# Patient Record
Sex: MALE | Race: WHITE | Employment: FULL TIME | URBAN - METROPOLITAN AREA
[De-identification: names, ages, dates, MRNs, and addresses within clinical notes are randomized per-mention and may not be internally consistent; named-entity substitution may affect disease eponyms.]

---

## 2017-01-30 RX ORDER — FINASTERIDE 1 MG/1
TABLET, FILM COATED ORAL
Qty: 30 TABLET | Refills: 11 | Status: SHIPPED | OUTPATIENT
Start: 2017-01-30 | End: 2017-02-14 | Stop reason: SDUPTHER

## 2017-02-14 ENCOUNTER — OFFICE VISIT (OUTPATIENT)
Dept: FAMILY MEDICINE CLINIC | Age: 42
End: 2017-02-14

## 2017-02-14 VITALS
DIASTOLIC BLOOD PRESSURE: 86 MMHG | TEMPERATURE: 98.7 F | HEIGHT: 75 IN | BODY MASS INDEX: 35.56 KG/M2 | SYSTOLIC BLOOD PRESSURE: 124 MMHG | RESPIRATION RATE: 16 BRPM | HEART RATE: 76 BPM | WEIGHT: 286 LBS

## 2017-02-14 DIAGNOSIS — Z13.220 LIPID SCREENING: ICD-10-CM

## 2017-02-14 DIAGNOSIS — Z00.00 PE (PHYSICAL EXAM), ANNUAL: Primary | ICD-10-CM

## 2017-02-14 DIAGNOSIS — E78.00 HYPERCHOLESTEROLEMIA: Primary | ICD-10-CM

## 2017-02-14 DIAGNOSIS — F60.5 OBSESSIVE-COMPULSIVE PERSONALITY DISORDER (HCC): ICD-10-CM

## 2017-02-14 DIAGNOSIS — E66.9 OBESITY, UNSPECIFIED OBESITY SEVERITY, UNSPECIFIED OBESITY TYPE: ICD-10-CM

## 2017-02-14 DIAGNOSIS — L64.9 MALE PATTERN BALDNESS: ICD-10-CM

## 2017-02-14 LAB
A/G RATIO: 2 (ref 1.1–2.2)
ALBUMIN SERPL-MCNC: 4.6 G/DL (ref 3.4–5)
ALP BLD-CCNC: 68 U/L (ref 40–129)
ALT SERPL-CCNC: 24 U/L (ref 10–40)
ANION GAP SERPL CALCULATED.3IONS-SCNC: 17 MMOL/L (ref 3–16)
AST SERPL-CCNC: 24 U/L (ref 15–37)
BILIRUB SERPL-MCNC: 0.7 MG/DL (ref 0–1)
BUN BLDV-MCNC: 13 MG/DL (ref 7–20)
CALCIUM SERPL-MCNC: 9 MG/DL (ref 8.3–10.6)
CHLORIDE BLD-SCNC: 101 MMOL/L (ref 99–110)
CHOLESTEROL, TOTAL: 210 MG/DL (ref 0–199)
CO2: 23 MMOL/L (ref 21–32)
CREAT SERPL-MCNC: 0.9 MG/DL (ref 0.9–1.3)
GFR AFRICAN AMERICAN: >60
GFR NON-AFRICAN AMERICAN: >60
GLOBULIN: 2.3 G/DL
GLUCOSE BLD-MCNC: 99 MG/DL (ref 70–99)
HDLC SERPL-MCNC: 41 MG/DL (ref 40–60)
LDL CHOLESTEROL CALCULATED: 153 MG/DL
POTASSIUM SERPL-SCNC: 4.2 MMOL/L (ref 3.5–5.1)
SODIUM BLD-SCNC: 141 MMOL/L (ref 136–145)
TOTAL PROTEIN: 6.9 G/DL (ref 6.4–8.2)
TRIGL SERPL-MCNC: 82 MG/DL (ref 0–150)
VLDLC SERPL CALC-MCNC: 16 MG/DL

## 2017-02-14 PROCEDURE — 99396 PREV VISIT EST AGE 40-64: CPT | Performed by: INTERNAL MEDICINE

## 2017-02-14 PROCEDURE — G0444 DEPRESSION SCREEN ANNUAL: HCPCS | Performed by: INTERNAL MEDICINE

## 2017-02-14 RX ORDER — FINASTERIDE 1 MG/1
TABLET, FILM COATED ORAL
Qty: 30 TABLET | Refills: 11 | Status: SHIPPED | OUTPATIENT
Start: 2017-02-14 | End: 2017-04-11 | Stop reason: SDUPTHER

## 2017-02-14 ASSESSMENT — ENCOUNTER SYMPTOMS
EYE PAIN: 0
WHEEZING: 0
VOMITING: 0
DIARRHEA: 0
COLOR CHANGE: 0
NAUSEA: 0
CONSTIPATION: 0
SHORTNESS OF BREATH: 0

## 2017-02-14 ASSESSMENT — PATIENT HEALTH QUESTIONNAIRE - PHQ9
SUM OF ALL RESPONSES TO PHQ9 QUESTIONS 1 & 2: 2
10. IF YOU CHECKED OFF ANY PROBLEMS, HOW DIFFICULT HAVE THESE PROBLEMS MADE IT FOR YOU TO DO YOUR WORK, TAKE CARE OF THINGS AT HOME, OR GET ALONG WITH OTHER PEOPLE: 0
9. THOUGHTS THAT YOU WOULD BE BETTER OFF DEAD, OR OF HURTING YOURSELF: 0
5. POOR APPETITE OR OVEREATING: 1
2. FEELING DOWN, DEPRESSED OR HOPELESS: 2
4. FEELING TIRED OR HAVING LITTLE ENERGY: 0
SUM OF ALL RESPONSES TO PHQ QUESTIONS 1-9: 8
8. MOVING OR SPEAKING SO SLOWLY THAT OTHER PEOPLE COULD HAVE NOTICED. OR THE OPPOSITE, BEING SO FIGETY OR RESTLESS THAT YOU HAVE BEEN MOVING AROUND A LOT MORE THAN USUAL: 0
6. FEELING BAD ABOUT YOURSELF - OR THAT YOU ARE A FAILURE OR HAVE LET YOURSELF OR YOUR FAMILY DOWN: 3
1. LITTLE INTEREST OR PLEASURE IN DOING THINGS: 0
7. TROUBLE CONCENTRATING ON THINGS, SUCH AS READING THE NEWSPAPER OR WATCHING TELEVISION: 0
3. TROUBLE FALLING OR STAYING ASLEEP: 2

## 2017-12-08 ENCOUNTER — TELEPHONE (OUTPATIENT)
Dept: FAMILY MEDICINE CLINIC | Age: 42
End: 2017-12-08

## 2017-12-08 NOTE — TELEPHONE ENCOUNTER
Pt is calling to see if he can get in today   He is having a panic attack and his psychiatrist is not in today. Please advise what you want to do for him.

## 2018-01-18 ENCOUNTER — TELEPHONE (OUTPATIENT)
Dept: FAMILY MEDICINE CLINIC | Age: 43
End: 2018-01-18

## 2018-01-19 NOTE — TELEPHONE ENCOUNTER
LMOM to tell the pt that the switch will be ok for him to tranfer from Dr. Lamont Hart to Dr. Marya Martinez. Thanks.

## 2018-02-05 ENCOUNTER — OFFICE VISIT (OUTPATIENT)
Dept: FAMILY MEDICINE CLINIC | Age: 43
End: 2018-02-05

## 2018-02-05 VITALS
RESPIRATION RATE: 18 BRPM | OXYGEN SATURATION: 98 % | WEIGHT: 286 LBS | TEMPERATURE: 98.8 F | HEART RATE: 82 BPM | BODY MASS INDEX: 35.56 KG/M2 | HEIGHT: 75 IN | SYSTOLIC BLOOD PRESSURE: 122 MMHG | DIASTOLIC BLOOD PRESSURE: 80 MMHG

## 2018-02-05 DIAGNOSIS — R45.89 DEPRESSED MOOD: ICD-10-CM

## 2018-02-05 DIAGNOSIS — Z00.00 WELL ADULT HEALTH CHECK: Primary | ICD-10-CM

## 2018-02-05 DIAGNOSIS — E66.3 OVERWEIGHT: ICD-10-CM

## 2018-02-05 DIAGNOSIS — Z23 NEED FOR INFLUENZA VACCINATION: ICD-10-CM

## 2018-02-05 DIAGNOSIS — Z82.49 FAMILY HISTORY OF HEART DISEASE: ICD-10-CM

## 2018-02-05 PROCEDURE — 99396 PREV VISIT EST AGE 40-64: CPT | Performed by: FAMILY MEDICINE

## 2018-02-05 PROCEDURE — 90630 INFLUENZA, QUADV, 18-64 YRS, ID, PF, MICRO INJ, 0.1ML (FLUZONE QUADV, PF): CPT | Performed by: FAMILY MEDICINE

## 2018-02-05 PROCEDURE — 90471 IMMUNIZATION ADMIN: CPT | Performed by: FAMILY MEDICINE

## 2018-02-05 RX ORDER — MULTIVITAMIN WITH IRON
100 TABLET ORAL DAILY
COMMUNITY
End: 2019-04-11

## 2018-02-05 RX ORDER — UBIDECARENONE 75 MG
50 CAPSULE ORAL DAILY
COMMUNITY
End: 2019-04-11

## 2018-02-05 RX ORDER — ESZOPICLONE 3 MG/1
3 TABLET, FILM COATED ORAL NIGHTLY
COMMUNITY

## 2018-02-05 RX ORDER — VENLAFAXINE 75 MG/1
75 TABLET ORAL 3 TIMES DAILY
COMMUNITY
End: 2020-06-22 | Stop reason: ALTCHOICE

## 2018-02-05 ASSESSMENT — ENCOUNTER SYMPTOMS
COLOR CHANGE: 0
SORE THROAT: 0
DIARRHEA: 0
SHORTNESS OF BREATH: 0
SINUS PRESSURE: 0
EYE REDNESS: 0
NAUSEA: 0
VOMITING: 0
COUGH: 0

## 2018-02-05 NOTE — PROGRESS NOTES
Results   Component Value Date    ALT 24 02/14/2017    AST 24 02/14/2017    ALKPHOS 68 02/14/2017    BILITOT 0.7 02/14/2017      Lab Results   Component Value Date    WBC 4.9 01/19/2012    HGB 16.0 01/19/2012    HCT 46.3 01/19/2012    MCV 86.3 01/19/2012     01/19/2012     TSH (uIU/ml)   Date Value   01/19/2012 1.76      PHYSICAL EXAM  /80 (Site: Right Arm, Position: Sitting, Cuff Size: Large Adult)   Pulse 82   Temp 98.8 °F (37.1 °C) (Oral)   Resp 18   Ht 6' 3\" (1.905 m)   Wt 286 lb (129.7 kg)   SpO2 98%   BMI 35.75 kg/m²     BP Readings from Last 3 Encounters:   02/05/18 122/80   02/14/17 124/86   02/20/16 128/70       Wt Readings from Last 3 Encounters:   02/05/18 286 lb (129.7 kg)   02/14/17 286 lb (129.7 kg)   02/20/16 293 lb (132.9 kg)        Physical Exam   Constitutional: He is oriented to person, place, and time. He appears well-developed and well-nourished. HENT:   Head: Normocephalic and atraumatic. Mouth/Throat: Oropharynx is clear and moist.   TMs normal bilaterally   Eyes: Conjunctivae and EOM are normal.   Neck: Normal range of motion. Neck supple. No thyromegaly present. Cardiovascular: Normal rate, regular rhythm and normal heart sounds. No murmur heard. Pulmonary/Chest: Effort normal and breath sounds normal. He has no wheezes. Abdominal: Soft. Bowel sounds are normal. He exhibits no mass. There is no tenderness. Musculoskeletal: Normal range of motion. He exhibits no edema. Lymphadenopathy:     He has no cervical adenopathy. Neurological: He is alert and oriented to person, place, and time. He displays normal reflexes. Skin: Skin is warm and dry. No rash noted. Normal turgor   Psychiatric: He has a normal mood and affect. Thought content normal.       ASSESSMENT/PLAN:  1. Well adult health check  Reviewed healthy lifestyle. He is rather healthy, encouraged increased physical activity  - Comprehensive Metabolic Panel;  Future  - Hemoglobin A1C; Future  - Lipid Panel; Future  - TSH without Reflex; Future    2. Family history of heart disease  Reviewed ASCVD risk score. Low risk no statin medicine at this time. 3. Overweight  Encouraged exercise as above    4. Depressed mood  Following Dr. Rodríguez Fried psychiatry. Reports improvement on Effexor    5. Need for influenza vaccination  - INFLUENZA, QUADV, 18-64 YRS, ID, PF, MICRO INJ, 0.1ML (Nasima Everett, JUAN)        Return in about 1 year (around 2/5/2019) for well check.

## 2018-02-05 NOTE — PATIENT INSTRUCTIONS
Blood pressure cuff: Omron brand is recommended. We are looking for a number less than 140/90. Check 3 times per week after resting for 3-5 minutes.

## 2018-09-06 ENCOUNTER — OFFICE VISIT (OUTPATIENT)
Dept: FAMILY MEDICINE CLINIC | Age: 43
End: 2018-09-06

## 2018-09-06 VITALS
SYSTOLIC BLOOD PRESSURE: 130 MMHG | RESPIRATION RATE: 18 BRPM | DIASTOLIC BLOOD PRESSURE: 80 MMHG | WEIGHT: 298 LBS | HEART RATE: 84 BPM | BODY MASS INDEX: 37.25 KG/M2

## 2018-09-06 DIAGNOSIS — L25.5 CONTACT DERMATITIS DUE TO PLANT: Primary | ICD-10-CM

## 2018-09-06 PROCEDURE — 99213 OFFICE O/P EST LOW 20 MIN: CPT | Performed by: NURSE PRACTITIONER

## 2018-09-06 RX ORDER — PREDNISONE 10 MG/1
TABLET ORAL
Qty: 27 TABLET | Refills: 0 | Status: SHIPPED | OUTPATIENT
Start: 2018-09-06 | End: 2018-09-16

## 2018-09-06 RX ORDER — FINASTERIDE 1 MG/1
TABLET, FILM COATED ORAL
Qty: 90 TABLET | Refills: 3 | Status: SHIPPED | OUTPATIENT
Start: 2018-09-06 | End: 2019-09-17 | Stop reason: SDUPTHER

## 2018-09-06 ASSESSMENT — PATIENT HEALTH QUESTIONNAIRE - PHQ9
SUM OF ALL RESPONSES TO PHQ9 QUESTIONS 1 & 2: 0
SUM OF ALL RESPONSES TO PHQ QUESTIONS 1-9: 0
2. FEELING DOWN, DEPRESSED OR HOPELESS: 0
SUM OF ALL RESPONSES TO PHQ QUESTIONS 1-9: 0
1. LITTLE INTEREST OR PLEASURE IN DOING THINGS: 0

## 2018-09-06 ASSESSMENT — ENCOUNTER SYMPTOMS
COUGH: 0
TROUBLE SWALLOWING: 0
SHORTNESS OF BREATH: 0
SORE THROAT: 0

## 2018-09-06 NOTE — PROGRESS NOTES
throat and trouble swallowing. Respiratory: Negative for cough and shortness of breath. Cardiovascular: Negative for chest pain. Skin: Positive for rash. Vitals:    09/06/18 1050   BP: 130/80   Site: Left Arm   Position: Sitting   Cuff Size: Large Adult   Pulse: 84   Resp: 18   Weight: 298 lb (135.2 kg)       Body mass index is 37.25 kg/m². Wt Readings from Last 3 Encounters:   09/06/18 298 lb (135.2 kg)   02/05/18 286 lb (129.7 kg)   02/14/17 286 lb (129.7 kg)       BP Readings from Last 3 Encounters:   09/06/18 130/80   02/05/18 122/80   02/14/17 124/86       Physical Exam   Constitutional: He is oriented to person, place, and time. He appears well-developed and well-nourished. No distress. HENT:   Head: Normocephalic and atraumatic. Eyes: EOM are normal.   Pulmonary/Chest: Effort normal.   Neurological: He is alert and oriented to person, place, and time. Skin: Skin is warm and dry. Jeffrey upper extremities with patches of erythematous skin. Positive for some intact blisters. No s/s of infection. Left side of neck and left side of face with patched of erythematous skin. No blistering noted. Psychiatric: He has a normal mood and affect. His behavior is normal. Judgment and thought content normal.   Nursing note and vitals reviewed. Assessment and Plan  Rojelio Crawford was seen today for poison ivy. Diagnoses and all orders for this visit:    Contact dermatitis due to plant  -     predniSONE (DELTASONE) 10 MG tablet; Take 4 tablets by mouth daily x3 days, 3 tablets x3 days, 2 tablets x2 days, 1 tablets x2 days. -     triamcinolone (KENALOG) 0.1 % ointment; Apply topically 2 times daily PRN  Cool compresses PRN  Advised not to scratch area. To monitor for secondary infection. Allegra 180 mg in AM and Benadryl 25-50 mg qhs. Advised to try to avoid plant contact in the future. Patient is to call if symptoms worsen or fail to improve. Return if symptoms worsen or fail to improve.

## 2018-09-25 ENCOUNTER — PATIENT MESSAGE (OUTPATIENT)
Dept: FAMILY MEDICINE CLINIC | Age: 43
End: 2018-09-25

## 2018-09-25 DIAGNOSIS — H91.90 HEARING PROBLEM, UNSPECIFIED LATERALITY: Primary | ICD-10-CM

## 2018-09-26 NOTE — TELEPHONE ENCOUNTER
From: Milagros Eisenberg  To: Bhanu Dee MD  Sent: 9/25/2018 5:01 PM EDT  Subject: Non-Urgent Medical Question    I've noticed lately that I have a loss of midrange frequencies in my left ear so I made an appointment with Dr. Saravanan Hill [email or web address] to take a look at me. The  who made the appointment said I might need to get a referral first so that the insurance will pay for it. So, I thought I'd keep the appointment (for Oct 5) and then see about getting a referral.    I've also had ringing in my ear, on and off, which seems to be related to Effexor that I'm taking. Should I schedule an appointment with you?      Thanks,  Day Marquez

## 2018-10-10 ENCOUNTER — OFFICE VISIT (OUTPATIENT)
Dept: ENT CLINIC | Age: 43
End: 2018-10-10
Payer: COMMERCIAL

## 2018-10-10 VITALS
SYSTOLIC BLOOD PRESSURE: 128 MMHG | WEIGHT: 297.4 LBS | HEIGHT: 73 IN | HEART RATE: 73 BPM | DIASTOLIC BLOOD PRESSURE: 81 MMHG | BODY MASS INDEX: 39.42 KG/M2 | TEMPERATURE: 98.7 F

## 2018-10-10 DIAGNOSIS — H93.13 TINNITUS OF BOTH EARS: ICD-10-CM

## 2018-10-10 DIAGNOSIS — H91.22 SUDDEN IDIOPATHIC HEARING LOSS OF LEFT EAR WITH UNRESTRICTED HEARING OF RIGHT EAR: Primary | ICD-10-CM

## 2018-10-10 PROCEDURE — 99243 OFF/OP CNSLTJ NEW/EST LOW 30: CPT | Performed by: OTOLARYNGOLOGY

## 2018-10-10 ASSESSMENT — ENCOUNTER SYMPTOMS
COLOR CHANGE: 0
SORE THROAT: 0
RHINORRHEA: 0
VOMITING: 0
CONSTIPATION: 0
PHOTOPHOBIA: 0
EYE DISCHARGE: 0
SINUS PRESSURE: 0
EYE ITCHING: 0
SINUS PAIN: 0
WHEEZING: 0
TROUBLE SWALLOWING: 0
SHORTNESS OF BREATH: 0
STRIDOR: 0
BLOOD IN STOOL: 0
COUGH: 0
DIARRHEA: 0
NAUSEA: 0
VOICE CHANGE: 0
FACIAL SWELLING: 0
CHOKING: 0
BACK PAIN: 0

## 2018-10-10 NOTE — PATIENT INSTRUCTIONS
There is slight asymmetry on the left ear at high frequencies. We will recheck the hearing in 6 months. Call is symptoms worsen sooner.

## 2018-10-10 NOTE — PROGRESS NOTES
Negative for adenopathy. Does not bruise/bleed easily. Psychiatric/Behavioral: Negative for agitation, confusion and sleep disturbance. Physical Exam     Vitals:    10/10/18 1309   BP: 128/81   Pulse: 73   Temp: 98.7 °F (37.1 °C)       Physical Exam   Constitutional: He is oriented to person, place, and time. He appears well-developed and well-nourished. HENT:   Head: Normocephalic and atraumatic. Not macrocephalic and not microcephalic. Head is without raccoon's eyes, without Brooke's sign, without abrasion, without contusion, without laceration, without right periorbital erythema and without left periorbital erythema. Hair is normal.   Right Ear: Hearing, tympanic membrane and external ear normal. No drainage, swelling or tenderness. No mastoid tenderness. Tympanic membrane is not perforated, not retracted and not bulging. Tympanic membrane mobility is normal. No middle ear effusion. No decreased hearing is noted. Left Ear: Hearing, tympanic membrane and external ear normal. No drainage, swelling or tenderness. No mastoid tenderness. Tympanic membrane is not perforated, not retracted and not bulging. Tympanic membrane mobility is normal.  No middle ear effusion. No decreased hearing is noted. Nose: Septal deviation present. No mucosal edema, rhinorrhea, nose lacerations, sinus tenderness, nasal deformity or nasal septal hematoma. No epistaxis. No foreign bodies. Right sinus exhibits no maxillary sinus tenderness and no frontal sinus tenderness. Left sinus exhibits no maxillary sinus tenderness and no frontal sinus tenderness. Mouth/Throat: Uvula is midline and oropharynx is clear and moist. Mucous membranes are not pale, not dry and not cyanotic. No oral lesions. No trismus in the jaw. Normal dentition. No dental abscesses, uvula swelling, lacerations or dental caries. No oropharyngeal exudate, posterior oropharyngeal edema, posterior oropharyngeal erythema or tonsillar abscesses.    Faye normal

## 2019-04-11 ENCOUNTER — OFFICE VISIT (OUTPATIENT)
Dept: ENT CLINIC | Age: 44
End: 2019-04-11
Payer: COMMERCIAL

## 2019-04-11 VITALS
WEIGHT: 300 LBS | DIASTOLIC BLOOD PRESSURE: 81 MMHG | BODY MASS INDEX: 39.76 KG/M2 | SYSTOLIC BLOOD PRESSURE: 129 MMHG | HEART RATE: 78 BPM | TEMPERATURE: 98.5 F | HEIGHT: 73 IN

## 2019-04-11 DIAGNOSIS — H91.22 SUDDEN IDIOPATHIC HEARING LOSS OF LEFT EAR WITH UNRESTRICTED HEARING OF RIGHT EAR: Primary | ICD-10-CM

## 2019-04-11 DIAGNOSIS — H93.13 TINNITUS OF BOTH EARS: ICD-10-CM

## 2019-04-11 PROCEDURE — 99213 OFFICE O/P EST LOW 20 MIN: CPT | Performed by: OTOLARYNGOLOGY

## 2019-04-11 ASSESSMENT — ENCOUNTER SYMPTOMS
COLOR CHANGE: 0
NAUSEA: 0
DIARRHEA: 0
SORE THROAT: 0
EYE PAIN: 0
RHINORRHEA: 0
STRIDOR: 0
SINUS PAIN: 0
FACIAL SWELLING: 0
COUGH: 0
VOICE CHANGE: 0
EYE ITCHING: 0
SINUS PRESSURE: 0
TROUBLE SWALLOWING: 0
EYE REDNESS: 0
CHOKING: 0
PHOTOPHOBIA: 0
SHORTNESS OF BREATH: 0

## 2019-04-11 NOTE — PROGRESS NOTES
[facility]  [facility] [address]LLI [address], [address] [facility], [address] [place] [address]  P: [phone]  F: [phone]       Patient     Jaylarily Dry  1975    ChiefComplaint     Chief Complaint   Patient presents with    Follow-up     Hearling Loss, no change       History of Present Illness     Chava Sow is here for six-month follow-up for left-sided sudden hearing loss. He states there is no significant change in hearing in the left ear. He has persistent bilateral tinnitus. Feels like the hearing may have improved somewhat. No ear infections or ear pain or dizziness over the last 6 months. Past Medical History     Past Medical History:   Diagnosis Date    Allergic rhinitis     Hearing loss     Rash     Sleep apnea     Tinnitus        Past Surgical History     Past Surgical History:   Procedure Laterality Date    APPENDECTOMY         Family History     Family History   Problem Relation Age of Onset    Heart Disease Father     Hypertension Father     Hypertension Brother     Diabetes Paternal Grandmother     Colon Cancer Neg Hx        Social History     Social History     Socioeconomic History    Marital status:      Spouse name: Leni [place] Number of children: 0    Years of education: Not on file    Highest education level: Not on file   Occupational History    Occupation: Manager of [employer]    Social Needs    Financial resource strain: Not on file    Food insecurity:     Worry: Not on file     Inability: Not on file    Transportation needs:     Medical: Not on file     Non-medical: Not on file   Tobacco Use    Smoking status: Never Smoker    Smokeless tobacco: Never Used    Tobacco comment: congratulated on nonsmoking status. Substance and Sexual Activity    Alcohol use:  Yes     Alcohol/week: 0.0 oz    Drug use: No    Sexual activity: Yes     Partners: Female   Lifestyle    Physical activity:     Days per week: Not on file     Minutes per session: Not on file    Stress: Not on file   Relationships    Social connections:     Talks on phone: Not on file     Gets together: Not on file     Attends Gnosticist service: Not on file     Active member of club or organization: Not on file     Attends meetings of clubs or organizations: Not on file     Relationship status: Not on file    Intimate partner violence:     Fear of current or ex partner: Not on file     Emotionally abused: Not on file     Physically abused: Not on file     Forced sexual activity: Not on file   Other Topics Concern    Not on file   Social History Narrative    Not on file       Allergies     Allergies   Allergen Reactions    Ampicillin     Cipro Xr        Medications     Current Outpatient Medications   Medication Sig Dispense Refill    finasteride (PROPECIA) 1 MG tablet TAKE 1 TABLET BY MOUTH DAILY 90 tablet 3    venlafaxine (EFFEXOR) 75 MG tablet Take 75 mg by mouth 3 times daily      eszopiclone (LUNESTA) 3 MG TABS Take 3 mg by mouth nightly.  Armodafinil (NUVIGIL PO) Take 150 mg by mouth daily        No current facility-administered medications for this visit. Review of Systems     Review of Systems   Constitutional: Negative for chills, fatigue and fever. HENT: Positive for hearing loss and tinnitus. Negative for congestion, ear discharge, ear pain, facial swelling, nosebleeds, postnasal drip, rhinorrhea, sinus pressure, sinus pain, sneezing, sore throat, trouble swallowing and voice change. Eyes: Negative for photophobia, pain, redness, itching and visual disturbance. Respiratory: Negative for cough, choking, shortness of breath and stridor. Gastrointestinal: Negative for diarrhea and nausea. Musculoskeletal: Negative for neck pain and neck stiffness. Skin: Negative for color change and rash. Neurological: Negative for dizziness, facial asymmetry and light-headedness. Hematological: Negative for adenopathy. Psychiatric/Behavioral: Negative for agitation and confusion. PhysicalExam     Vitals:    04/11/19 1120   BP: 129/81   Pulse: 78   Temp: 98.5 °F (36.9 °C)       Physical Exam   Constitutional: He is oriented to person, place, and time. He appears well-developed and well-nourished. HENT:   Head: Normocephalic and atraumatic. Right Ear: Tympanic membrane, external ear and ear canal normal. No drainage. Tympanic membrane is not perforated. No middle ear effusion. Left Ear: Tympanic membrane, external ear and ear canal normal. No drainage. Tympanic membrane is not perforated. No middle ear effusion. Nose: No mucosal edema, rhinorrhea or septal deviation. No epistaxis. Mouth/Throat: Uvula is midline, oropharynx is clear and moist and mucous membranes are normal. No trismus in the jaw. Normal dentition. No oropharyngeal exudate. Eyes: Pupils are equal, round, and reactive to light. EOM are normal. Right eye exhibits no discharge. Left eye exhibits no discharge. No scleral icterus. Neck: Phonation normal. Neck supple. No tracheal deviation present. No thyromegaly present. Pulmonary/Chest: Effort normal. No stridor. No respiratory distress. Lymphadenopathy:     He has no cervical adenopathy. Neurological: He is alert and oriented to person, place, and time. No cranial nerve deficit. Skin: Skin is warm and dry. Psychiatric: He has a normal mood and affect. His behavior is normal.         Procedure           Assessment and Plan     1. Sudden idiopathic hearing loss of left ear with unrestricted hearing of right ear  Significant change in hearing subjectively. We will obtain a repeat audiogram.  I will call him with the results once completed. - Audiometry with tympanometry; Future    2. Tinnitus of both ears    - Audiometry with tympanometry; Future      Return if symptoms worsen or fail to improve. Portions of this note were dictated using Dragon.  There may be linguistic errors secondary to the use of this program.

## 2019-04-24 ENCOUNTER — OFFICE VISIT (OUTPATIENT)
Dept: FAMILY MEDICINE CLINIC | Age: 44
End: 2019-04-24
Payer: COMMERCIAL

## 2019-04-24 VITALS
WEIGHT: 295 LBS | SYSTOLIC BLOOD PRESSURE: 122 MMHG | DIASTOLIC BLOOD PRESSURE: 80 MMHG | BODY MASS INDEX: 39.1 KG/M2 | HEART RATE: 76 BPM | HEIGHT: 73 IN | OXYGEN SATURATION: 98 % | RESPIRATION RATE: 15 BRPM

## 2019-04-24 DIAGNOSIS — Z13.220 SCREENING FOR HYPERLIPIDEMIA: ICD-10-CM

## 2019-04-24 DIAGNOSIS — L91.8 INFLAMED ACROCHORDON: Primary | ICD-10-CM

## 2019-04-24 PROCEDURE — 11420 EXC H-F-NK-SP B9+MARG 0.5/<: CPT | Performed by: FAMILY MEDICINE

## 2019-04-24 ASSESSMENT — PATIENT HEALTH QUESTIONNAIRE - PHQ9
1. LITTLE INTEREST OR PLEASURE IN DOING THINGS: 0
2. FEELING DOWN, DEPRESSED OR HOPELESS: 0
SUM OF ALL RESPONSES TO PHQ QUESTIONS 1-9: 0
SUM OF ALL RESPONSES TO PHQ QUESTIONS 1-9: 0
SUM OF ALL RESPONSES TO PHQ9 QUESTIONS 1 & 2: 0

## 2019-04-26 DIAGNOSIS — Z13.220 SCREENING FOR HYPERLIPIDEMIA: ICD-10-CM

## 2019-04-26 LAB
A/G RATIO: 1.5 (ref 1.1–2.2)
ALBUMIN SERPL-MCNC: 4.4 G/DL (ref 3.4–5)
ALP BLD-CCNC: 67 U/L (ref 40–129)
ALT SERPL-CCNC: 19 U/L (ref 10–40)
ANION GAP SERPL CALCULATED.3IONS-SCNC: 13 MMOL/L (ref 3–16)
AST SERPL-CCNC: 16 U/L (ref 15–37)
BILIRUB SERPL-MCNC: 0.5 MG/DL (ref 0–1)
BUN BLDV-MCNC: 11 MG/DL (ref 7–20)
CALCIUM SERPL-MCNC: 9.4 MG/DL (ref 8.3–10.6)
CHLORIDE BLD-SCNC: 101 MMOL/L (ref 99–110)
CHOLESTEROL, TOTAL: 178 MG/DL (ref 0–199)
CO2: 27 MMOL/L (ref 21–32)
CREAT SERPL-MCNC: 0.8 MG/DL (ref 0.9–1.3)
GFR AFRICAN AMERICAN: >60
GFR NON-AFRICAN AMERICAN: >60
GLOBULIN: 3 G/DL
GLUCOSE BLD-MCNC: 103 MG/DL (ref 70–99)
HDLC SERPL-MCNC: 30 MG/DL (ref 40–60)
LDL CHOLESTEROL CALCULATED: 109 MG/DL
POTASSIUM SERPL-SCNC: 4.7 MMOL/L (ref 3.5–5.1)
SODIUM BLD-SCNC: 141 MMOL/L (ref 136–145)
TOTAL PROTEIN: 7.4 G/DL (ref 6.4–8.2)
TRIGL SERPL-MCNC: 194 MG/DL (ref 0–150)
VLDLC SERPL CALC-MCNC: 39 MG/DL

## 2019-09-17 ENCOUNTER — PATIENT MESSAGE (OUTPATIENT)
Dept: FAMILY MEDICINE CLINIC | Age: 44
End: 2019-09-17

## 2019-09-17 NOTE — TELEPHONE ENCOUNTER
From: Toya Rutledge  To: Sue Pelayo MD  Sent: 9/17/2019 4:07 PM EDT  Subject: Non-Urgent Medical Question    My psychiatrist is out of town until the 29th. I just got off of Effexor for depression. My last dose was a week ago. I was planning on seeing my psychiatrist in three weeks to make sure the Effexor is completely out of my system. At that point I plan on asking to be put back on Prozac, which worked pretty good for me in the past. Anyway, she told me to make an appointment earlier if I needed it. And I need it. I'm having some depression and anger, mostly just at night. I was down to a very low dose of Effexor when I finally quit, but this feels like withdrawal. It's not my normal flavor of depression. If I came in, would there be a low dose anti-depression/anxiety pill you could put me on to help take the edge off? Just until I can get in to see my psychiatrist. Thanks.

## 2019-09-19 RX ORDER — FINASTERIDE 1 MG/1
TABLET, FILM COATED ORAL
Qty: 90 TABLET | Refills: 0 | Status: SHIPPED | OUTPATIENT
Start: 2019-09-19 | End: 2019-12-15 | Stop reason: SDUPTHER

## 2019-09-20 ENCOUNTER — OFFICE VISIT (OUTPATIENT)
Dept: FAMILY MEDICINE CLINIC | Age: 44
End: 2019-09-20
Payer: COMMERCIAL

## 2019-09-20 VITALS
SYSTOLIC BLOOD PRESSURE: 116 MMHG | DIASTOLIC BLOOD PRESSURE: 84 MMHG | TEMPERATURE: 98.4 F | BODY MASS INDEX: 40.05 KG/M2 | OXYGEN SATURATION: 98 % | WEIGHT: 302.2 LBS | RESPIRATION RATE: 15 BRPM | HEART RATE: 92 BPM | HEIGHT: 73 IN

## 2019-09-20 DIAGNOSIS — Z23 NEEDS FLU SHOT: ICD-10-CM

## 2019-09-20 DIAGNOSIS — F39 MOOD DISORDER (HCC): Primary | ICD-10-CM

## 2019-09-20 PROCEDURE — 99213 OFFICE O/P EST LOW 20 MIN: CPT | Performed by: FAMILY MEDICINE

## 2019-09-20 PROCEDURE — 90471 IMMUNIZATION ADMIN: CPT | Performed by: FAMILY MEDICINE

## 2019-09-20 PROCEDURE — 90686 IIV4 VACC NO PRSV 0.5 ML IM: CPT | Performed by: FAMILY MEDICINE

## 2019-09-20 RX ORDER — FLUOXETINE HYDROCHLORIDE 20 MG/1
CAPSULE ORAL
Qty: 60 CAPSULE | Refills: 5 | Status: SHIPPED | OUTPATIENT
Start: 2019-09-20 | End: 2020-05-13 | Stop reason: SDUPTHER

## 2019-09-20 RX ORDER — BUSPIRONE HYDROCHLORIDE 7.5 MG/1
7.5 TABLET ORAL 3 TIMES DAILY
Qty: 90 TABLET | Refills: 0 | Status: SHIPPED | OUTPATIENT
Start: 2019-09-20 | End: 2019-10-20

## 2019-12-16 RX ORDER — FINASTERIDE 1 MG/1
TABLET, FILM COATED ORAL
Qty: 90 TABLET | Refills: 0 | Status: SHIPPED | OUTPATIENT
Start: 2019-12-16 | End: 2020-03-13

## 2020-01-21 ENCOUNTER — PATIENT MESSAGE (OUTPATIENT)
Dept: FAMILY MEDICINE CLINIC | Age: 45
End: 2020-01-21

## 2020-03-13 RX ORDER — FINASTERIDE 1 MG/1
TABLET, FILM COATED ORAL
Qty: 90 TABLET | Refills: 0 | Status: SHIPPED | OUTPATIENT
Start: 2020-03-13 | End: 2020-05-27

## 2020-03-17 ENCOUNTER — PATIENT MESSAGE (OUTPATIENT)
Dept: FAMILY MEDICINE CLINIC | Age: 45
End: 2020-03-17

## 2020-03-18 NOTE — TELEPHONE ENCOUNTER
send someone else? Thanks. - - - DISCLAIMER - - -  [email or web address][This message may contain formatting that cannot be shown on this site.]

## 2020-05-13 ENCOUNTER — TELEMEDICINE (OUTPATIENT)
Dept: FAMILY MEDICINE CLINIC | Age: 45
End: 2020-05-13
Payer: COMMERCIAL

## 2020-05-13 VITALS — TEMPERATURE: 97.1 F | WEIGHT: 295 LBS | BODY MASS INDEX: 38.92 KG/M2

## 2020-05-13 PROCEDURE — 99213 OFFICE O/P EST LOW 20 MIN: CPT | Performed by: FAMILY MEDICINE

## 2020-05-13 RX ORDER — FLUOXETINE HYDROCHLORIDE 20 MG/1
60 CAPSULE ORAL DAILY
Qty: 90 CAPSULE | Refills: 2 | Status: SHIPPED | OUTPATIENT
Start: 2020-05-13 | End: 2021-01-22 | Stop reason: SDUPTHER

## 2020-05-13 RX ORDER — CLINDAMYCIN HYDROCHLORIDE 300 MG/1
300 CAPSULE ORAL 2 TIMES DAILY
Qty: 14 CAPSULE | Refills: 0 | Status: SHIPPED | OUTPATIENT
Start: 2020-05-13 | End: 2020-05-20

## 2020-05-13 RX ORDER — FLUOXETINE HYDROCHLORIDE 20 MG/1
60 CAPSULE ORAL DAILY
Qty: 90 CAPSULE | Refills: 2 | Status: CANCELLED | OUTPATIENT
Start: 2020-05-13

## 2020-05-13 NOTE — TELEPHONE ENCOUNTER
According to Dr. Jose Luis Macedo notes on 9/20/19 patient was to be on Prozac 40 mg daily (2 pills)and start seeing Dr. Tana Smith in psychiatry for management of this. I spoke with patient and he states he did see Dr. Tana Smith once but she has retired and he has not found anyone new yet to see. He needs a refill and states that Dr. Tana Smith had increased the Prozac to 60 mg (3 pills daily). Is this okay?     Please advise

## 2020-05-13 NOTE — PROGRESS NOTES
5/13/2020    This is a 53 y.o. male     HPI  Pt with concerns for fatigue and Left-sided mouth discomfort and pain and an enlarged lymph node    Fatigue is somewhat chronic but worse over the past 5 days  - he felt like something get stuck in his Invisalign one night and then started noticing pain and swelling in his left upper gum. He has not noticed any drainage. He denies fever or chills. Does note fatigue. He denies any neck pain or swelling down that side, although the area feels sensitive closer to his jaw line    He also notes an enlarged lymph node on the R side of his neck. At the time that he first noticed it, about 8 weeks ago, he had URI symptoms as well. He felt like it was decreasing in size but now seems to be getting larger  - He states it feels firm but is not painful when he touches it. When palpating it he states it feels firm in its place    Review of Systems   As per HPI, otherwise negative    Past Medical History:   Diagnosis Date    Allergic rhinitis     Hearing loss     Rash     Sleep apnea     Tinnitus        Past Surgical History:   Procedure Laterality Date    APPENDECTOMY         Family History   Problem Relation Age of Onset    Heart Disease Father     Hypertension Father     Hypertension Brother     Diabetes Paternal Grandmother     Colon Cancer Neg Hx        Current Outpatient Medications   Medication Sig Dispense Refill    clindamycin (CLEOCIN) 300 MG capsule Take 1 capsule by mouth 2 times daily for 7 days 14 capsule 0    finasteride (PROPECIA) 1 MG tablet TAKE 1 TABLET BY MOUTH DAILY 90 tablet 0    FLUoxetine (PROZAC) 20 MG capsule Take 1 tab by mouth for first two weeks, followed by 2 tabs by mouth daily. 60 capsule 5    eszopiclone (LUNESTA) 3 MG TABS Take 3 mg by mouth nightly.       Armodafinil (NUVIGIL PO) Take 150 mg by mouth daily       venlafaxine (EFFEXOR) 75 MG tablet Take 75 mg by mouth 3 times daily       No current facility-administered medications for this visit. Temp 97.1 °F (36.2 °C) Comment: pt reported  Wt 295 lb (133.8 kg) Comment: pt reported  BMI 38.92 kg/m²     Physical Exam  Skin:     Comments: R lateral portion of neck with oval-shaped enlargement         Wt Readings from Last 3 Encounters:   05/13/20 295 lb (133.8 kg)   09/20/19 (!) 302 lb 3.2 oz (137.1 kg)   04/24/19 295 lb (133.8 kg)       BP Readings from Last 3 Encounters:   09/20/19 116/84   04/24/19 122/80   04/11/19 129/81         Assessment/Plan:  1. Dental infection  Treat with Clinda and call if not improving  - clindamycin (CLEOCIN) 300 MG capsule; Take 1 capsule by mouth 2 times daily for 7 days  Dispense: 14 capsule; Refill: 0    2. Enlarged lymph node in neck  Pt reports this has been present for the past 2 months. May have started with URI but he feels it is consistently getting larger. It is on opposite side of his current dental issue. Advised taking antibiotics first for #1 above, if node is not improving can call ENT for further eval / biopsy if indicated. Referral placed. - Lucía Neely MD, Otolaryngology, [facility] Lottie Healy is a 53 y.o. male being evaluated by a Virtual Visit (video visit) encounter to address concerns as mentioned above. A caregiver was present when appropriate. Due to this being a TeleHealth encounter (During [facility] [place] [facility]- public health emergency), evaluation of the following organ systems was limited: Vitals/Constitutional/EENT/Resp/CV/GI//MS/Neuro/Skin/Heme-Lymph-Imm. Pursuant to the emergency declaration under the [address] [facility], [address] [place] [address] waiver authority and the [employer] Act, this Virtual Visit was conducted with patient's (and/or legal guardian's) consent, to reduce the patient's risk of exposure to COVID-19 and provide necessary medical care.   The patient (and/or legal guardian) has also been advised to contact this office for worsening conditions or problems, and seek emergency medical treatment and/or call 911 if deemed necessary. Patient identification was verified at the start of the visit: yes    Total time spent for this encounter: not billed on time    Services were provided through a video synchronous discussion virtually to substitute for in-person clinic visit. Patient and provider were located at their individual homes. --Caity Young MD on 5/13/2020 at 10:29 AM    An electronic signature was used to authenticate this note.

## 2020-05-27 RX ORDER — FINASTERIDE 1 MG/1
TABLET, FILM COATED ORAL
Qty: 90 TABLET | Refills: 0 | Status: SHIPPED | OUTPATIENT
Start: 2020-05-27 | End: 2020-08-14

## 2020-06-01 ENCOUNTER — OFFICE VISIT (OUTPATIENT)
Dept: ENT CLINIC | Age: 45
End: 2020-06-01
Payer: COMMERCIAL

## 2020-06-01 VITALS
HEIGHT: 73 IN | TEMPERATURE: 99.2 F | SYSTOLIC BLOOD PRESSURE: 142 MMHG | BODY MASS INDEX: 39.49 KG/M2 | WEIGHT: 298 LBS | HEART RATE: 91 BPM | OXYGEN SATURATION: 96 % | DIASTOLIC BLOOD PRESSURE: 84 MMHG

## 2020-06-01 PROCEDURE — 10021 FNA BX W/O IMG GDN 1ST LES: CPT | Performed by: OTOLARYNGOLOGY

## 2020-06-01 PROCEDURE — 31575 DIAGNOSTIC LARYNGOSCOPY: CPT | Performed by: OTOLARYNGOLOGY

## 2020-06-01 PROCEDURE — 99203 OFFICE O/P NEW LOW 30 MIN: CPT | Performed by: OTOLARYNGOLOGY

## 2020-06-01 NOTE — PROGRESS NOTES
lesions/tumors  Neuro:  cranial nerve II-XII intact; normal gait  Cardio:  no edema    PROCEDURE  Flexible laryngoscopy  Afrin was applied the bilateral nasal cavity. Flexible scope was passed to the left nasal cavity. Normal nasopharynx. Normal base of tongue and vallecula. Normal supraglottic and glottic structures. Fine-needle aspiration of right neck mass  Informed consent obtained  Milliliter 1% lidocaine with epinephrine was injected to the skin over top of the right neck mass. 3 different passes with 22-gauge needles were then used to take samples of the mass. This was placed in Cytol and sent for pathology. Patient tolerated well. ASSESSMENT/PLAN  1. Neck mass  I am not sure what this neck mass is. It could be a relatively superficial etiology such as an epidermal inclusion cyst, but I do think that it is deeper than that. This could represent something like a level 2 lymph node versus a tail of parotid lesion. We will see what the FNA shows. Based on this will determine what the next step would be. Also we will get a CT scan to see the exact location of this. This help determine whether or not it is an intraparotid lesion or a level 2 neck node. It also rule out other lymphadenopathy. I will see him on Monday to discuss the results of the FNA and hopefully will have the CT scan by then as well.  - CYTOLOGY, NON-GYN; Future  - CT SOFT TISSUE NECK W CONTRAST; Future             I have performed a head and neck physical exam personally or was physically present during the key or critical portions of the service. Medical Decision Making:   The following items were considered in medical decision making:  Independent review of images  Review / order clinical lab tests  Review / order radiology tests  Decision to obtain old records

## 2020-06-05 ENCOUNTER — APPOINTMENT (OUTPATIENT)
Dept: CT IMAGING | Age: 45
End: 2020-06-05
Payer: COMMERCIAL

## 2020-06-05 ENCOUNTER — HOSPITAL ENCOUNTER (OUTPATIENT)
Dept: CT IMAGING | Age: 45
Discharge: HOME | End: 2020-06-05
Payer: COMMERCIAL

## 2020-06-05 PROCEDURE — 6360000004 HC RX CONTRAST MEDICATION: Performed by: OTOLARYNGOLOGY

## 2020-06-05 PROCEDURE — 70491 CT SOFT TISSUE NECK W/DYE: CPT

## 2020-06-05 RX ADMIN — IOPAMIDOL 75 ML: 755 INJECTION, SOLUTION INTRAVENOUS at 15:47

## 2020-06-08 ENCOUNTER — OFFICE VISIT (OUTPATIENT)
Dept: ENT CLINIC | Age: 45
End: 2020-06-08
Payer: COMMERCIAL

## 2020-06-08 VITALS
WEIGHT: 300 LBS | BODY MASS INDEX: 39.76 KG/M2 | HEART RATE: 82 BPM | SYSTOLIC BLOOD PRESSURE: 157 MMHG | DIASTOLIC BLOOD PRESSURE: 90 MMHG | TEMPERATURE: 97.9 F | HEIGHT: 73 IN

## 2020-06-08 PROCEDURE — 99213 OFFICE O/P EST LOW 20 MIN: CPT | Performed by: OTOLARYNGOLOGY

## 2020-06-08 NOTE — PROGRESS NOTES
Relationships    Social connections     Talks on phone: Not on file     Gets together: Not on file     Attends Temple service: Not on file     Active member of club or organization: Not on file     Attends meetings of clubs or organizations: Not on file     Relationship status: Not on file    Intimate partner violence     Fear of current or ex partner: Not on file     Emotionally abused: Not on file     Physically abused: Not on file     Forced sexual activity: Not on file   Other Topics Concern    Not on file   Social History Narrative    Not on file       DRUG/FOOD ALLERGIES: Ampicillin and Cipro xr    CURRENT MEDICATIONS  Prior to Admission medications    Medication Sig Start Date End Date Taking? Authorizing Provider   finasteride (PROPECIA) 1 MG tablet TAKE 1 TABLET BY MOUTH DAILY 5/27/20   Jacinda Ervin MD   FLUoxetine (PROZAC) 20 MG capsule Take 3 capsules by mouth daily 5/13/20   Jacinda Ervin, MD   venlafaxine (EFFEXOR) 75 MG tablet Take 75 mg by mouth 3 times daily    Historical Provider, MD   eszopiclone (LUNESTA) 3 MG TABS Take 3 mg by mouth nightly.     Historical Provider, MD   Armodafinil (NUVIGIL PO) Take 150 mg by mouth daily     Historical Provider, MD       REVIEW OF SYSTEMS  The following systems were reviewed and revealed the following in addition to any already discussed in the HPI:    CONSTITUTIONAL: no weight loss, no fever, no night sweats, no chills  EYES: no vision changes, no blurry vision  EARS: no changes in hearing, no otalgia  NOSE: no epistaxis, no rhinorrhea  RESPIRATORY: no  Difficulty breathing, no shortness of breath  CV: no chest pain, no Peripheral vascular disease  HEME: No coagulation disorder, no Bleeding disorder  NEURO: no TIA or stroke-like symptoms  SKIN: No new rashes in the head and neck, no recent skin cancers  MOUTH: No new ulcers, no recent teeth infections    Neck: Right neck mass  GASTROINTESTINAL: No diarrhea, stomach pain  PSYCH: No anxiety, no

## 2020-06-17 ENCOUNTER — OFFICE VISIT (OUTPATIENT)
Dept: PRIMARY CARE CLINIC | Age: 45
End: 2020-06-17

## 2020-06-17 NOTE — PATIENT INSTRUCTIONS
Steps to help prevent the spread of COVID-19 if you are sick  SOURCE - https://orr-lindsey.info/. html     Stay home except to get medical care   ; Stay home: People who are mildly ill with COVID-19 are able to isolate at home during their illness. You should restrict activities outside your home, except for getting medical care.   ; Avoid public areas: Do not go to work, school, or public areas.   ; Avoid public transportation: Avoid using public transportation, ride-sharing, or taxis.  ; Separate yourself from other people and animals in your home   ; Stay away from others: As much as possible, you should stay in a specific room and away from other people in your home. Also, you should use a separate bathroom, if available.   ; Limit contact with pets & animals: You should restrict contact with pets and other animals while you are sick with COVID-19, just like you would around other people. Although there have not been reports of pets or other animals becoming sick with COVID-19, it is still recommended that people sick with COVID-19 limit contact with animals until more information is known about the virus. ; When possible, have another member of your household care for your animals while you are sick. If you are sick with COVID-19, avoid contact with your pet, including petting, snuggling, being kissed or licked, and sharing food. If you must care for your pet or be around animals while you are sick, wash your hands before and after you interact with pets and wear a facemask. See COVID-19 and Animals for more information. Other considerations   The ill person should eat/be fed in their room if possible. Non-disposable  items used should be handled with gloves and washed with hot water or in a . Clean hands after handling used  items.  If possible, dedicate a lined trash can for the ill person.  Use gloves when removing garbage bags, handling, and disposing of trash. Wash hands after handling or disposing of trash.  Consider consulting with your local health department about trash disposal guidance if available. Information for Household Members and Caregivers of Someone who is Sick   Call ahead before visiting your doctor   Call ahead: If you have a medical appointment, call the healthcare provider and tell them that you have or may have COVID-19. This will help the healthcare provider's office take steps to keep other people from getting infected or exposed. Wear a facemask if you are sick   ; If you are sick: You should wear a facemask when you are around other people (e.g., sharing a room or vehicle) or pets and before you enter a healthcare provider's office. ; If you are caring for others: If the person who is sick is not able to wear a facemask (for example, because it causes trouble breathing), then people who live with the person who is sick should not stay in the same room with them, or they should wear a facemask if they enter a room with the person who is sick. Cover your coughs and sneezes   ; Cover: Cover your mouth and nose with a tissue when you cough or sneeze.   ; Dispose: Throw used tissues in a lined trash can.   ; Wash hands: Immediately wash your hands with soap and water for at least 20 seconds or, if soap and water are not available, clean your hands with an alcohol-based hand  that contains at least 60% alcohol. Clean your hands often   ;  Wash hands: Wash your hands often with soap and water for at least 20 seconds, especially after blowing your nose, coughing, or sneezing; going to the bathroom; and before eating or preparing food.   ; Hand : If soap and water are not readily available, use an alcohol-based hand  with at least 60% alcohol, covering all surfaces of your hands and rubbing them together until they feel dry.   ; Soap and water: Soap and water are the best option if hands are visibly dirty.   ; Avoid touching: Avoid touching your eyes, nose, and mouth with unwashed hands. Handwashing Tips   ; Wet your hands with clean, running water (warm or cold), turn off the tap, and apply soap.  ; Lather your hands by rubbing them together with the soap. Lather the backs of your hands, between your fingers, and under your nails. ; Scrub your hands for at least 20 seconds. Need a timer? Hum the Ranger from beginning to end twice.  ; Rinse your hands well under clean, running water.  ; Dry your hands using a clean towel or air dry them. Avoid sharing personal household items   ; Do not share: You should not share dishes, drinking glasses, cups, eating utensils, towels, or bedding with other people or pets in your home.   ; Wash thoroughly after use: After using these items, they should be washed thoroughly with soap and water. Clean all high-touch surfaces everyday   ; Clean and disinfect: Practice routine cleaning of high touch surfaces.  ; High touch surfaces include counters, tabletops, doorknobs, bathroom fixtures, toilets, phones, keyboards, tablets, and bedside tables.  ; Disinfect areas with bodily fluids: Also, clean any surfaces that may have blood, stool, or body fluids on them.   ; Household : Use a household cleaning spray or wipe, according to the label instructions. Labels contain instructions for safe and effective use of the cleaning product including precautions you should take when applying the product, such as wearing gloves and making sure you have good ventilation during use of the product.     Monitor your symptoms   Seek medical attention: Seek prompt medical attention if your illness is worsening     (e.g., difficulty breathing).   ; Call your doctor: Before seeking care, call your healthcare provider and tell them that you have, or are being evaluated for, COVID-19.   ; Wear a facemask when sick: Put on a facemask before you enter the

## 2020-06-19 LAB
SARS-COV-2: NOT DETECTED
SOURCE: NORMAL

## 2020-06-22 ENCOUNTER — ANESTHESIA EVENT (OUTPATIENT)
Dept: OPERATING ROOM | Age: 45
End: 2020-06-22
Payer: COMMERCIAL

## 2020-06-22 SDOH — HEALTH STABILITY: MENTAL HEALTH: HOW OFTEN DO YOU HAVE A DRINK CONTAINING ALCOHOL?: NEVER

## 2020-06-22 NOTE — PROGRESS NOTES
[facility] time______0900______        Surgery time____________    Take the following medications with a sip of water: Follow your Doctor's pre procedure instructions regarding medications    Do not eat or drink anything after 12:00 midnight prior to your surgery. This includes water chewing gum, mints and ice chips. You may brush your teeth and gargle the morning of your surgery, but do not swallow the water     Please see your family doctor/pediatrician for a history and physical and/or concerning medications. Bring any test results/reports from your physicians office. If you are under the care of a heart doctor or specialist doctor, please be aware that you may be asked to them for clearance    You may be asked to stop blood thinners such as Coumadin, Plavix, Fragmin, Lovenox, etc., or any anti-inflammatories such as:  Aspirin, Ibuprofen, Advil, Naproxen prior to your surgery. We also ask that you stop any OTC medications such as fish oil, vitamin E, glucosamine, garlic, Multivitamins, COQ 10, etc.    We ask that you do not smoke 24 hours prior to surgery  We ask that you do not  drink any alcoholic beverages 24 hours prior to surgery     You must make arrangements for a responsible adult to take you home after your surgery. For your safety you will not be allowed to leave alone or drive yourself home. Your surgery will be cancelled if you do not have a ride home. Also for your safety, it is strongly suggested that someone stay with you the first 24 hours after your surgery. A parent or legal guardian must accompany a child scheduled for surgery and plan to stay at the hospital until the child is discharged. Please do not bring other children with you. For your comfort, please wear simple loose fitting clothing to the hospital.  Please do not bring valuables.     Do not wear any make-up or nail polish on your fingers or toes      For your

## 2020-06-23 ENCOUNTER — HOSPITAL ENCOUNTER (OUTPATIENT)
Age: 45
Setting detail: OUTPATIENT SURGERY
Discharge: HOME | End: 2020-06-23
Attending: OTOLARYNGOLOGY | Admitting: OTOLARYNGOLOGY
Payer: COMMERCIAL

## 2020-06-23 ENCOUNTER — ANESTHESIA (OUTPATIENT)
Dept: OPERATING ROOM | Age: 45
End: 2020-06-23
Payer: COMMERCIAL

## 2020-06-23 VITALS
OXYGEN SATURATION: 94 % | WEIGHT: 299.83 LBS | DIASTOLIC BLOOD PRESSURE: 69 MMHG | RESPIRATION RATE: 14 BRPM | HEIGHT: 73 IN | HEART RATE: 92 BPM | SYSTOLIC BLOOD PRESSURE: 138 MMHG | BODY MASS INDEX: 39.74 KG/M2 | TEMPERATURE: 96.9 F

## 2020-06-23 VITALS
SYSTOLIC BLOOD PRESSURE: 161 MMHG | TEMPERATURE: 98.6 F | RESPIRATION RATE: 7 BRPM | DIASTOLIC BLOOD PRESSURE: 94 MMHG | OXYGEN SATURATION: 97 %

## 2020-06-23 PROCEDURE — 88305 TISSUE EXAM BY PATHOLOGIST: CPT

## 2020-06-23 PROCEDURE — 38510 BIOPSY/REMOVAL LYMPH NODES: CPT | Performed by: OTOLARYNGOLOGY

## 2020-06-23 PROCEDURE — 7100000000 HC PACU RECOVERY - FIRST 15 MIN: Performed by: OTOLARYNGOLOGY

## 2020-06-23 PROCEDURE — 7100000001 HC PACU RECOVERY - ADDTL 15 MIN: Performed by: OTOLARYNGOLOGY

## 2020-06-23 PROCEDURE — 2500000003 HC RX 250 WO HCPCS

## 2020-06-23 PROCEDURE — 2580000003 HC RX 258: Performed by: ANESTHESIOLOGY

## 2020-06-23 PROCEDURE — 2500000003 HC RX 250 WO HCPCS: Performed by: OTOLARYNGOLOGY

## 2020-06-23 PROCEDURE — 6370000000 HC RX 637 (ALT 250 FOR IP): Performed by: OTOLARYNGOLOGY

## 2020-06-23 PROCEDURE — 6360000002 HC RX W HCPCS

## 2020-06-23 PROCEDURE — 2709999900 HC NON-CHARGEABLE SUPPLY: Performed by: OTOLARYNGOLOGY

## 2020-06-23 PROCEDURE — 88342 IMHCHEM/IMCYTCHM 1ST ANTB: CPT

## 2020-06-23 PROCEDURE — 7100000011 HC PHASE II RECOVERY - ADDTL 15 MIN: Performed by: OTOLARYNGOLOGY

## 2020-06-23 PROCEDURE — 3700000000 HC ANESTHESIA ATTENDED CARE: Performed by: OTOLARYNGOLOGY

## 2020-06-23 PROCEDURE — 88341 IMHCHEM/IMCYTCHM EA ADD ANTB: CPT

## 2020-06-23 PROCEDURE — 3600000012 HC SURGERY LEVEL 2 ADDTL 15MIN: Performed by: OTOLARYNGOLOGY

## 2020-06-23 PROCEDURE — 3700000001 HC ADD 15 MINUTES (ANESTHESIA): Performed by: OTOLARYNGOLOGY

## 2020-06-23 PROCEDURE — 88184 FLOWCYTOMETRY/ TC 1 MARKER: CPT

## 2020-06-23 PROCEDURE — 3600000002 HC SURGERY LEVEL 2 BASE: Performed by: OTOLARYNGOLOGY

## 2020-06-23 PROCEDURE — 7100000010 HC PHASE II RECOVERY - FIRST 15 MIN: Performed by: OTOLARYNGOLOGY

## 2020-06-23 PROCEDURE — 88185 FLOWCYTOMETRY/TC ADD-ON: CPT

## 2020-06-23 PROCEDURE — 2580000003 HC RX 258: Performed by: OTOLARYNGOLOGY

## 2020-06-23 RX ORDER — MEPERIDINE HYDROCHLORIDE 25 MG/ML
12.5 INJECTION INTRAMUSCULAR; INTRAVENOUS; SUBCUTANEOUS ONCE
Status: DISCONTINUED | OUTPATIENT
Start: 2020-06-23 | End: 2020-06-23 | Stop reason: HOSPADM

## 2020-06-23 RX ORDER — ONDANSETRON 2 MG/ML
INJECTION INTRAMUSCULAR; INTRAVENOUS PRN
Status: DISCONTINUED | OUTPATIENT
Start: 2020-06-23 | End: 2020-06-23 | Stop reason: SDUPTHER

## 2020-06-23 RX ORDER — GINSENG 100 MG
CAPSULE ORAL
Status: COMPLETED | OUTPATIENT
Start: 2020-06-23 | End: 2020-06-23

## 2020-06-23 RX ORDER — MIDAZOLAM HYDROCHLORIDE 1 MG/ML
INJECTION INTRAMUSCULAR; INTRAVENOUS PRN
Status: DISCONTINUED | OUTPATIENT
Start: 2020-06-23 | End: 2020-06-23 | Stop reason: SDUPTHER

## 2020-06-23 RX ORDER — ONDANSETRON 2 MG/ML
4 INJECTION INTRAMUSCULAR; INTRAVENOUS
Status: DISCONTINUED | OUTPATIENT
Start: 2020-06-23 | End: 2020-06-23 | Stop reason: HOSPADM

## 2020-06-23 RX ORDER — SODIUM CHLORIDE 9 MG/ML
INJECTION, SOLUTION INTRAVENOUS CONTINUOUS
Status: DISCONTINUED | OUTPATIENT
Start: 2020-06-23 | End: 2020-06-23 | Stop reason: HOSPADM

## 2020-06-23 RX ORDER — PROPOFOL 10 MG/ML
INJECTION, EMULSION INTRAVENOUS PRN
Status: DISCONTINUED | OUTPATIENT
Start: 2020-06-23 | End: 2020-06-23 | Stop reason: SDUPTHER

## 2020-06-23 RX ORDER — FENTANYL CITRATE 50 UG/ML
25 INJECTION, SOLUTION INTRAMUSCULAR; INTRAVENOUS EVERY 5 MIN PRN
Status: DISCONTINUED | OUTPATIENT
Start: 2020-06-23 | End: 2020-06-23 | Stop reason: HOSPADM

## 2020-06-23 RX ORDER — BACITRACIN ZINC AND POLYMYXIN B SULFATE 500; 1000 [USP'U]/G; [USP'U]/G
OINTMENT TOPICAL
Qty: 1 TUBE | Refills: 0 | Status: SHIPPED | OUTPATIENT
Start: 2020-06-23 | End: 2020-06-30

## 2020-06-23 RX ORDER — SODIUM CHLORIDE 0.9 % (FLUSH) 0.9 %
10 SYRINGE (ML) INJECTION EVERY 12 HOURS SCHEDULED
Status: DISCONTINUED | OUTPATIENT
Start: 2020-06-23 | End: 2020-06-23 | Stop reason: HOSPADM

## 2020-06-23 RX ORDER — HYDROCODONE BITARTRATE AND ACETAMINOPHEN 5; 325 MG/1; MG/1
1 TABLET ORAL EVERY 4 HOURS PRN
Qty: 18 TABLET | Refills: 0 | Status: SHIPPED | OUTPATIENT
Start: 2020-06-23 | End: 2020-06-26

## 2020-06-23 RX ORDER — HYDROCODONE BITARTRATE AND ACETAMINOPHEN 5; 325 MG/1; MG/1
1 TABLET ORAL PRN
Status: DISCONTINUED | OUTPATIENT
Start: 2020-06-23 | End: 2020-06-23 | Stop reason: HOSPADM

## 2020-06-23 RX ORDER — LIDOCAINE HYDROCHLORIDE 20 MG/ML
INJECTION, SOLUTION EPIDURAL; INFILTRATION; INTRACAUDAL; PERINEURAL PRN
Status: DISCONTINUED | OUTPATIENT
Start: 2020-06-23 | End: 2020-06-23 | Stop reason: SDUPTHER

## 2020-06-23 RX ORDER — SUCCINYLCHOLINE/SOD CL,ISO/PF 200MG/10ML
SYRINGE (ML) INTRAVENOUS PRN
Status: DISCONTINUED | OUTPATIENT
Start: 2020-06-23 | End: 2020-06-23 | Stop reason: SDUPTHER

## 2020-06-23 RX ORDER — HYDROCODONE BITARTRATE AND ACETAMINOPHEN 5; 325 MG/1; MG/1
2 TABLET ORAL PRN
Status: DISCONTINUED | OUTPATIENT
Start: 2020-06-23 | End: 2020-06-23 | Stop reason: HOSPADM

## 2020-06-23 RX ORDER — MAGNESIUM HYDROXIDE 1200 MG/15ML
LIQUID ORAL CONTINUOUS PRN
Status: COMPLETED | OUTPATIENT
Start: 2020-06-23 | End: 2020-06-23

## 2020-06-23 RX ORDER — PROMETHAZINE HYDROCHLORIDE 25 MG/ML
6.25 INJECTION, SOLUTION INTRAMUSCULAR; INTRAVENOUS
Status: DISCONTINUED | OUTPATIENT
Start: 2020-06-23 | End: 2020-06-23 | Stop reason: HOSPADM

## 2020-06-23 RX ORDER — SODIUM CHLORIDE 0.9 % (FLUSH) 0.9 %
10 SYRINGE (ML) INJECTION PRN
Status: DISCONTINUED | OUTPATIENT
Start: 2020-06-23 | End: 2020-06-23 | Stop reason: HOSPADM

## 2020-06-23 RX ORDER — LIDOCAINE HYDROCHLORIDE AND EPINEPHRINE 10; 10 MG/ML; UG/ML
INJECTION, SOLUTION INFILTRATION; PERINEURAL
Status: COMPLETED | OUTPATIENT
Start: 2020-06-23 | End: 2020-06-23

## 2020-06-23 RX ORDER — DEXAMETHASONE SODIUM PHOSPHATE 4 MG/ML
INJECTION, SOLUTION INTRA-ARTICULAR; INTRALESIONAL; INTRAMUSCULAR; INTRAVENOUS; SOFT TISSUE PRN
Status: DISCONTINUED | OUTPATIENT
Start: 2020-06-23 | End: 2020-06-23 | Stop reason: SDUPTHER

## 2020-06-23 RX ORDER — HYDRALAZINE HYDROCHLORIDE 20 MG/ML
5 INJECTION INTRAMUSCULAR; INTRAVENOUS EVERY 10 MIN PRN
Status: DISCONTINUED | OUTPATIENT
Start: 2020-06-23 | End: 2020-06-23 | Stop reason: HOSPADM

## 2020-06-23 RX ORDER — FENTANYL CITRATE 50 UG/ML
INJECTION, SOLUTION INTRAMUSCULAR; INTRAVENOUS PRN
Status: DISCONTINUED | OUTPATIENT
Start: 2020-06-23 | End: 2020-06-23 | Stop reason: SDUPTHER

## 2020-06-23 RX ORDER — FENTANYL CITRATE 50 UG/ML
50 INJECTION, SOLUTION INTRAMUSCULAR; INTRAVENOUS EVERY 5 MIN PRN
Status: DISCONTINUED | OUTPATIENT
Start: 2020-06-23 | End: 2020-06-23 | Stop reason: HOSPADM

## 2020-06-23 RX ADMIN — Medication 180 MG: at 10:40

## 2020-06-23 RX ADMIN — Medication 20 MG: at 10:41

## 2020-06-23 RX ADMIN — ONDANSETRON 4 MG: 2 INJECTION INTRAMUSCULAR; INTRAVENOUS at 11:21

## 2020-06-23 RX ADMIN — LIDOCAINE HYDROCHLORIDE 60 MG: 20 INJECTION, SOLUTION EPIDURAL; INFILTRATION; INTRACAUDAL; PERINEURAL at 10:40

## 2020-06-23 RX ADMIN — PROPOFOL 200 MG: 10 INJECTION, EMULSION INTRAVENOUS at 10:40

## 2020-06-23 RX ADMIN — MIDAZOLAM 2 MG: 1 INJECTION INTRAMUSCULAR; INTRAVENOUS at 10:35

## 2020-06-23 RX ADMIN — SODIUM CHLORIDE: 9 INJECTION, SOLUTION INTRAVENOUS at 09:52

## 2020-06-23 RX ADMIN — DEXAMETHASONE SODIUM PHOSPHATE 10 MG: 4 INJECTION, SOLUTION INTRAMUSCULAR; INTRAVENOUS at 10:45

## 2020-06-23 RX ADMIN — SODIUM CHLORIDE: 9 INJECTION, SOLUTION INTRAVENOUS at 10:35

## 2020-06-23 RX ADMIN — FENTANYL CITRATE 100 MCG: 50 INJECTION INTRAMUSCULAR; INTRAVENOUS at 10:40

## 2020-06-23 ASSESSMENT — PULMONARY FUNCTION TESTS
PIF_VALUE: 17
PIF_VALUE: 22
PIF_VALUE: 3
PIF_VALUE: 1
PIF_VALUE: 15
PIF_VALUE: 30
PIF_VALUE: 5
PIF_VALUE: 15
PIF_VALUE: 15
PIF_VALUE: 27
PIF_VALUE: 17
PIF_VALUE: 22
PIF_VALUE: 0
PIF_VALUE: 16
PIF_VALUE: 17
PIF_VALUE: 30
PIF_VALUE: 17
PIF_VALUE: 27
PIF_VALUE: 28
PIF_VALUE: 29
PIF_VALUE: 27
PIF_VALUE: 15
PIF_VALUE: 17
PIF_VALUE: 16
PIF_VALUE: 15
PIF_VALUE: 17
PIF_VALUE: 20
PIF_VALUE: 30
PIF_VALUE: 1
PIF_VALUE: 15
PIF_VALUE: 26
PIF_VALUE: 15
PIF_VALUE: 17
PIF_VALUE: 28
PIF_VALUE: 20
PIF_VALUE: 21
PIF_VALUE: 16
PIF_VALUE: 28
PIF_VALUE: 16
PIF_VALUE: 3
PIF_VALUE: 15
PIF_VALUE: 15
PIF_VALUE: 17
PIF_VALUE: 16
PIF_VALUE: 16
PIF_VALUE: 28
PIF_VALUE: 17
PIF_VALUE: 1
PIF_VALUE: 28
PIF_VALUE: 2
PIF_VALUE: 20
PIF_VALUE: 15
PIF_VALUE: 29
PIF_VALUE: 21
PIF_VALUE: 16
PIF_VALUE: 16
PIF_VALUE: 17
PIF_VALUE: 13
PIF_VALUE: 26
PIF_VALUE: 18
PIF_VALUE: 28
PIF_VALUE: 16
PIF_VALUE: 17
PIF_VALUE: 15
PIF_VALUE: 19
PIF_VALUE: 2
PIF_VALUE: 20

## 2020-06-23 ASSESSMENT — PAIN SCALES - GENERAL
PAINLEVEL_OUTOF10: 0

## 2020-06-23 ASSESSMENT — LIFESTYLE VARIABLES: SMOKING_STATUS: 0

## 2020-06-23 ASSESSMENT — PAIN - FUNCTIONAL ASSESSMENT: PAIN_FUNCTIONAL_ASSESSMENT: 0-10

## 2020-06-23 NOTE — PROGRESS NOTES
Pt arrived to phase 2 from PACU. Pt awake and alert. Right neck incision AUREA - no redness or drainage noted. Pt denies c/o pain at this time.  VSS

## 2020-06-23 NOTE — ANESTHESIA PRE PROCEDURE
06/08/20 (!) 157/90   06/01/20 (!) 142/84   09/20/19 116/84     Vital Signs Statistics (for past 48 hrs)     No data recorded  BP Readings from Last 3 Encounters:   06/08/20 (!) 157/90   06/01/20 (!) 142/84   09/20/19 116/84       BMI  There is no height or weight on file to calculate BMI. Estimated body mass index is 39.58 kg/m² as calculated from the following:    Height as of 6/8/20: 6' 1\" (1.854 m). Weight as of 6/8/20: 300 lb (136.1 kg). CBC   Lab Results   Component Value Date    WBC 4.9 01/19/2012    RBC 5.36 01/19/2012    HGB 16.0 01/19/2012    HCT 46.3 01/19/2012    MCV 86.3 01/19/2012    RDW 14.0 01/19/2012     01/19/2012     CMP    Lab Results   Component Value Date     04/26/2019    K 4.7 04/26/2019     04/26/2019    CO2 27 04/26/2019    BUN 11 04/26/2019    CREATININE 0.8 04/26/2019    GFRAA >60 04/26/2019    GFRAA >60 01/19/2012    AGRATIO 1.5 04/26/2019    LABGLOM >60 04/26/2019    GLUCOSE 103 04/26/2019    PROT 7.4 04/26/2019    PROT 7.4 01/19/2012    CALCIUM 9.4 04/26/2019    BILITOT 0.5 04/26/2019    ALKPHOS 67 04/26/2019    AST 16 04/26/2019    ALT 19 04/26/2019     BMP    Lab Results   Component Value Date     04/26/2019    K 4.7 04/26/2019     04/26/2019    CO2 27 04/26/2019    BUN 11 04/26/2019    CREATININE 0.8 04/26/2019    CALCIUM 9.4 04/26/2019    GFRAA >60 04/26/2019    GFRAA >60 01/19/2012    LABGLOM >60 04/26/2019    GLUCOSE 103 04/26/2019     POCGlucose  No results for input(s): GLUCOSE in the last 72 hours.    Coags  No results found for: PROTIME, INR, APTT  HCG (If Applicable) No results found for: PREGTESTUR, PREGSERUM, HCG, HCGQUANT   ABGs No results found for: PHART, PO2ART, LCB6UTU, QSG2GQR, BEART, N5SEHHFG   Type & Screen (If Applicable)  No results found for: LABABO, LABRH                         BMI: Wt Readings from Last 3 Encounters:       NPO Status:  >8h                          Anesthesia Evaluation  Patient summary reviewed no history of anesthetic complications:   Airway: Mallampati: II  TM distance: >3 FB   Neck ROM: full  Mouth opening: > = 3 FB Dental: normal exam         Pulmonary: breath sounds clear to auscultation  (+) sleep apnea:      (-) COPD, asthma, recent URI and not a current smoker                           Cardiovascular:  Exercise tolerance: good (>4 METS),       (-) hypertension, past MI, CABG/stent and  angina      Rhythm: regular  Rate: normal                    Neuro/Psych:   (+) psychiatric history: stable with treatment   (-) seizures, TIA and CVA           GI/Hepatic/Renal:   (+) morbid obesity     (-) GERD, liver disease and no renal disease       Endo/Other:        (-) diabetes mellitus, hypothyroidism               Abdominal:           Vascular:     - DVT. Anesthesia Plan      general     ASA 2       Induction: intravenous. MIPS: Postoperative opioids intended and Prophylactic antiemetics administered. Anesthetic plan and risks discussed with patient. Plan discussed with CRNA. This pre-anesthesia assessment may be used as a history and physical.    DOS STAFF ADDENDUM:    Pt seen and examined, chart reviewed (including anesthesia, drug and allergy history). No interval changes to history and physical examination. Anesthetic plan, risks, benefits, alternatives, and personnel involved discussed with patient. Patient verbalized an understanding and agrees to proceed.       Rebecca Campbell MD  June 23, 2020  9:35 AM

## 2020-06-23 NOTE — PROGRESS NOTES
Pt taking diet cola and cookies and tolerates sitting up in chair. Denies pain. Pt dressing in chair with wife present. Wife left to get the car. Walked pt to discharge.

## 2020-06-29 ENCOUNTER — OFFICE VISIT (OUTPATIENT)
Dept: ENT CLINIC | Age: 45
End: 2020-06-29

## 2020-06-29 VITALS — BODY MASS INDEX: 39.36 KG/M2 | TEMPERATURE: 96.8 F | HEIGHT: 73 IN | WEIGHT: 297 LBS

## 2020-06-29 PROCEDURE — 99024 POSTOP FOLLOW-UP VISIT: CPT | Performed by: OTOLARYNGOLOGY

## 2020-06-29 RX ORDER — CLINDAMYCIN HYDROCHLORIDE 300 MG/1
300 CAPSULE ORAL 3 TIMES DAILY
Qty: 21 CAPSULE | Refills: 0 | Status: SHIPPED | OUTPATIENT
Start: 2020-06-29 | End: 2020-07-06

## 2020-07-01 ENCOUNTER — TELEMEDICINE (OUTPATIENT)
Dept: FAMILY MEDICINE CLINIC | Age: 45
End: 2020-07-01
Payer: COMMERCIAL

## 2020-07-01 PROCEDURE — 99213 OFFICE O/P EST LOW 20 MIN: CPT | Performed by: FAMILY MEDICINE

## 2020-07-01 RX ORDER — CLONAZEPAM 0.5 MG/1
0.5 TABLET ORAL 2 TIMES DAILY PRN
Qty: 60 TABLET | Refills: 0 | Status: SHIPPED | OUTPATIENT
Start: 2020-07-01 | End: 2022-03-15

## 2020-07-01 ASSESSMENT — PATIENT HEALTH QUESTIONNAIRE - PHQ9
2. FEELING DOWN, DEPRESSED OR HOPELESS: 0
SUM OF ALL RESPONSES TO PHQ QUESTIONS 1-9: 0
1. LITTLE INTEREST OR PLEASURE IN DOING THINGS: 0
SUM OF ALL RESPONSES TO PHQ9 QUESTIONS 1 & 2: 0
SUM OF ALL RESPONSES TO PHQ QUESTIONS 1-9: 0

## 2020-07-01 NOTE — PROGRESS NOTES
7/1/2020    This is a 53 y.o. male   Chief Complaint   Patient presents with    Anxiety     dad had a stroke, and pt was lyphoma     HPI   Pt would like to discuss anxiety  - pt currently on 60mg Prozac daily. Lunesta and Nuvigil as well through his sleep doctor  - recently, his father had a stroke and has had carotid artery repair. Pt also recently had a lymph node biopsy revealing lymphoma. Since then, his anxiety understandably has been much higher  - he is scheduled to see Dr. Rayne Canchola tomorrow    Review of Systems   As per HPI, otherwise negative    Past Medical History:   Diagnosis Date    Allergic rhinitis     Depression     Hearing loss     Pt states his hearing is within the normal range    Rash     Sleep apnea     Tinnitus        Past Surgical History:   Procedure Laterality Date    ABDOMEN SURGERY      exploratory    APPENDECTOMY      NECK SURGERY N/A 6/23/2020    EXCISION RIGHT CERVICAL LYMPH NODE performed by Henok Alvares MD at [address] [place] [address] History   Problem Relation Age of Onset    Heart Disease Father     Hypertension Father     Hypertension Brother     Diabetes Paternal Grandmother     Colon Cancer Neg Hx        Current Outpatient Medications   Medication Sig Dispense Refill    clonazePAM (KLONOPIN) 0.5 MG tablet Take 1 tablet by mouth 2 times daily as needed for Anxiety for up to 30 days. 60 tablet 0    clindamycin (CLEOCIN) 300 MG capsule Take 1 capsule by mouth 3 times daily for 7 days 21 capsule 0    finasteride (PROPECIA) 1 MG tablet TAKE 1 TABLET BY MOUTH DAILY 90 tablet 0    FLUoxetine (PROZAC) 20 MG capsule Take 3 capsules by mouth daily 90 capsule 2    eszopiclone (LUNESTA) 3 MG TABS Take 3 mg by mouth nightly.  Armodafinil (NUVIGIL PO) Take 200 mg by mouth daily        No current facility-administered medications for this visit. There were no vitals taken for this visit.     Physical Exam  Constitutional:       Appearance: He is well-developed. HENT:      Head: Normocephalic and atraumatic. Pulmonary:      Effort: Pulmonary effort is normal.   Skin:     Coloration: Skin is not pale. Neurological:      Mental Status: He is alert and oriented to person, place, and time. Wt Readings from Last 3 Encounters:   06/29/20 297 lb (134.7 kg)   06/23/20 299 lb 13.2 oz (136 kg)   06/08/20 300 lb (136.1 kg)       BP Readings from Last 3 Encounters:   06/23/20 (!) 161/94   06/23/20 138/69   06/08/20 (!) 157/90         Assessment/Plan:  1. Anxiety disorder due to general medical condition with panic attack  - clonazePAM (KLONOPIN) 0.5 MG tablet; Take 1 tablet by mouth 2 times daily as needed for Anxiety for up to 30 days. Dispense: 60 tablet; Refill: 0    Pt with underlying anxiety / OCD now with recent stressors - father had a stroke and is dealing with aphasia, patient recently dx with lymphoma and scheduled to see oncology tomorrow. Already on SSRI. Will add klonopin BID PRN. Reviewed controlled substance policy in detail, potential for dependence/abuse, not to drink EtOH with medication, etc.    F/u 2 months. Sooner if symptoms not improving    Asha Miramontes is a 53 y.o. male being evaluated by a Virtual Visit (video visit) encounter to address concerns as mentioned above. A caregiver was present when appropriate. Due to this being a TeleHealth encounter (During [facility]-23 public health emergency), evaluation of the following organ systems was limited: Vitals/Constitutional/EENT/Resp/CV/GI//MS/Neuro/Skin/Heme-Lymph-Imm. Pursuant to the emergency declaration under the [facility], [facility] authority and the [employer] and [employer] Act, this Virtual Visit was conducted with patient's (and/or legal guardian's) consent, to reduce the patient's risk of exposure to COVID-19 and provide necessary medical care.   The patient (and/or legal guardian) has also

## 2020-07-04 ENCOUNTER — PATIENT MESSAGE (OUTPATIENT)
Dept: FAMILY MEDICINE CLINIC | Age: 45
End: 2020-07-04

## 2020-07-06 NOTE — TELEPHONE ENCOUNTER
From: Courtney Richards  To: Isabel Shaikh MD  Sent: 2020 11:37 PM EDT  Subject: Non-Urgent Medical Question    Feeling much better after my visit with Dr Zelalem Cruz. I'm getting scanned on Monday and on Wednesday we'll talk about treatment. I mentioned exhaustion and he mentioned checking my thyroid during our visit. I know there have been a number of blood tests for various things I was supposed to get but never made time for. They are probably  by now. Is it possible to renew any orders I never followed up on? And add a thyroid test?    I talked to my dad on FaceTime and his speech was greatly improved. It was the first time in weeks we've been able to have an actual conversation. Not sure how he's doing with walking, because they're only doing very rudimentary pt right now. No waking yet.     Thanks,  [employer]

## 2020-07-09 PROBLEM — C82.11 GRADE 2 FOLLICULAR LYMPHOMA OF LYMPH NODES OF NECK (HCC): Status: ACTIVE | Noted: 2020-07-09

## 2020-08-14 RX ORDER — FINASTERIDE 1 MG/1
TABLET, FILM COATED ORAL
Qty: 90 TABLET | Refills: 0 | Status: SHIPPED | OUTPATIENT
Start: 2020-08-14 | End: 2020-11-13 | Stop reason: SDUPTHER

## 2020-11-13 RX ORDER — FINASTERIDE 1 MG/1
TABLET, FILM COATED ORAL
Qty: 90 TABLET | Refills: 0 | Status: SHIPPED | OUTPATIENT
Start: 2020-11-13 | End: 2021-02-10

## 2021-01-08 ENCOUNTER — HOSPITAL ENCOUNTER (OUTPATIENT)
Dept: PET IMAGING | Age: 46
Discharge: HOME | End: 2021-01-08
Payer: COMMERCIAL

## 2021-01-08 VITALS — HEIGHT: 73 IN | WEIGHT: 297 LBS | BODY MASS INDEX: 39.36 KG/M2

## 2021-01-08 DIAGNOSIS — R53.83 FATIGUE, UNSPECIFIED TYPE: ICD-10-CM

## 2021-01-08 DIAGNOSIS — C82.01 FOLLICULAR LYMPHOMA GRADE I, LYMPH NODES OF HEAD, FACE, AND NECK (HCC): ICD-10-CM

## 2021-01-08 LAB
A/G RATIO: 1.4 (ref 1.1–2.2)
ALBUMIN SERPL-MCNC: 4.2 G/DL (ref 3.4–5)
ALP BLD-CCNC: 72 U/L (ref 40–129)
ALT SERPL-CCNC: 24 U/L (ref 10–40)
ANION GAP SERPL CALCULATED.3IONS-SCNC: 7 MMOL/L (ref 3–16)
AST SERPL-CCNC: 29 U/L (ref 15–37)
BASOPHILS ABSOLUTE: 0 K/UL (ref 0–0.2)
BASOPHILS RELATIVE PERCENT: 0.7 %
BILIRUB SERPL-MCNC: 0.3 MG/DL (ref 0–1)
BUN BLDV-MCNC: 14 MG/DL (ref 7–20)
CALCIUM SERPL-MCNC: 9.2 MG/DL (ref 8.3–10.6)
CHLORIDE BLD-SCNC: 103 MMOL/L (ref 99–110)
CO2: 30 MMOL/L (ref 21–32)
CREAT SERPL-MCNC: 0.9 MG/DL (ref 0.9–1.3)
EOSINOPHILS ABSOLUTE: 0.4 K/UL (ref 0–0.6)
EOSINOPHILS RELATIVE PERCENT: 6.7 %
GFR AFRICAN AMERICAN: >60
GFR NON-AFRICAN AMERICAN: >60
GLOBULIN: 3 G/DL
GLUCOSE BLD-MCNC: 93 MG/DL (ref 70–99)
HCT VFR BLD CALC: 45.4 % (ref 40.5–52.5)
HEMOGLOBIN: 15.5 G/DL (ref 13.5–17.5)
LYMPHOCYTES ABSOLUTE: 1.4 K/UL (ref 1–5.1)
LYMPHOCYTES RELATIVE PERCENT: 26.9 %
MCH RBC QN AUTO: 30.3 PG (ref 26–34)
MCHC RBC AUTO-ENTMCNC: 34 G/DL (ref 31–36)
MCV RBC AUTO: 89 FL (ref 80–100)
MONOCYTES ABSOLUTE: 0.5 K/UL (ref 0–1.3)
MONOCYTES RELATIVE PERCENT: 8.6 %
NEUTROPHILS ABSOLUTE: 3 K/UL (ref 1.7–7.7)
NEUTROPHILS RELATIVE PERCENT: 57.1 %
PDW BLD-RTO: 13.9 % (ref 12.4–15.4)
PLATELET # BLD: 213 K/UL (ref 135–450)
PMV BLD AUTO: 8.5 FL (ref 5–10.5)
POTASSIUM SERPL-SCNC: 5.1 MMOL/L (ref 3.5–5.1)
RBC # BLD: 5.11 M/UL (ref 4.2–5.9)
SODIUM BLD-SCNC: 140 MMOL/L (ref 136–145)
TOTAL PROTEIN: 7.2 G/DL (ref 6.4–8.2)
TSH REFLEX FT4: 2.33 UIU/ML (ref 0.27–4.2)
VITAMIN B-12: 555 PG/ML (ref 211–911)
WBC # BLD: 5.3 K/UL (ref 4–11)

## 2021-01-08 PROCEDURE — A9552 F18 FDG: HCPCS | Performed by: INTERNAL MEDICINE

## 2021-01-08 PROCEDURE — 78815 PET IMAGE W/CT SKULL-THIGH: CPT

## 2021-01-08 PROCEDURE — 3430000000 HC RX DIAGNOSTIC RADIOPHARMACEUTICAL: Performed by: INTERNAL MEDICINE

## 2021-01-08 RX ORDER — FLUDEOXYGLUCOSE F 18 200 MCI/ML
13.31 INJECTION, SOLUTION INTRAVENOUS
Status: COMPLETED | OUTPATIENT
Start: 2021-01-08 | End: 2021-01-08

## 2021-01-08 RX ADMIN — FLUDEOXYGLUCOSE F 18 13.31 MILLICURIE: 200 INJECTION, SOLUTION INTRAVENOUS at 11:54

## 2021-01-22 DIAGNOSIS — F39 MOOD DISORDER (HCC): ICD-10-CM

## 2021-01-22 RX ORDER — FLUOXETINE HYDROCHLORIDE 20 MG/1
60 CAPSULE ORAL DAILY
Qty: 90 CAPSULE | Refills: 2 | Status: SHIPPED | OUTPATIENT
Start: 2021-01-22 | End: 2021-06-01

## 2021-02-10 RX ORDER — FINASTERIDE 1 MG/1
TABLET, FILM COATED ORAL
Qty: 90 TABLET | Refills: 0 | Status: SHIPPED | OUTPATIENT
Start: 2021-02-10 | End: 2021-06-09

## 2021-04-05 ENCOUNTER — VIRTUAL VISIT (OUTPATIENT)
Dept: FAMILY MEDICINE CLINIC | Age: 46
End: 2021-04-05
Payer: COMMERCIAL

## 2021-04-05 ENCOUNTER — NURSE TRIAGE (OUTPATIENT)
Dept: OTHER | Facility: CLINIC | Age: 46
End: 2021-04-05

## 2021-04-05 ENCOUNTER — TELEPHONE (OUTPATIENT)
Dept: FAMILY MEDICINE CLINIC | Age: 46
End: 2021-04-05

## 2021-04-05 DIAGNOSIS — H93.8X9 EAR CONGESTION, UNSPECIFIED LATERALITY: Primary | ICD-10-CM

## 2021-04-05 PROCEDURE — 99213 OFFICE O/P EST LOW 20 MIN: CPT | Performed by: FAMILY MEDICINE

## 2021-04-05 RX ORDER — FEXOFENADINE HCL AND PSEUDOEPHEDRINE HCI 180; 240 MG/1; MG/1
1 TABLET, EXTENDED RELEASE ORAL DAILY
Qty: 7 TABLET | Refills: 0 | Status: SHIPPED | OUTPATIENT
Start: 2021-04-05 | End: 2022-03-15

## 2021-04-05 RX ORDER — FLUTICASONE PROPIONATE 50 MCG
2 SPRAY, SUSPENSION (ML) NASAL DAILY
Qty: 1 BOTTLE | Refills: 1 | Status: SHIPPED | OUTPATIENT
Start: 2021-04-05 | End: 2022-03-15

## 2021-04-05 ASSESSMENT — PATIENT HEALTH QUESTIONNAIRE - PHQ9
SUM OF ALL RESPONSES TO PHQ QUESTIONS 1-9: 0
SUM OF ALL RESPONSES TO PHQ QUESTIONS 1-9: 0
2. FEELING DOWN, DEPRESSED OR HOPELESS: 0
SUM OF ALL RESPONSES TO PHQ QUESTIONS 1-9: 0

## 2021-04-05 NOTE — PROGRESS NOTES
4/5/2021    This is a 50 y.o. male   Chief Complaint   Patient presents with    Otalgia     ear pain in Lt ear. slight runny nose, congestion, for 3 days     HPI    Virtual visit  - concern for ear congestion. Going on for the past 3 days. Not popping. If he blows his nose he notes a sensation of fullness and pressure. He was slightly dizzy on Friday night and this has improved. Occasional mild twinge of pain  - some mild congestion associated with this  - no cough or breathing issues  - no fever, chills, lose of smell or taste  - notes some seasonal allergy issues typically  - some sneezing. Denies eye symptoms. 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) - s/p excision, watchful waiting,  7/9/2020    Hearing loss     Pt states his hearing is within the normal range    Rash     Sleep apnea     Tinnitus        Past Surgical History:   Procedure Laterality Date    ABDOMEN SURGERY      exploratory    APPENDECTOMY      NECK SURGERY N/A 6/23/2020    EXCISION RIGHT CERVICAL LYMPH NODE performed by Judy Laguna MD at [address] [place] [address] History   Problem Relation Age of Onset    Heart Disease Father     Hypertension Father     Hypertension Brother     Diabetes Paternal Grandmother     Colon Cancer Neg Hx        Current Outpatient Medications   Medication Sig Dispense Refill    fluticasone (FLONASE) 50 MCG/ACT nasal spray 2 sprays by Each Nostril route daily 1 Bottle 1    fexofenadine-pseudoephedrine (ALLEGRA-D 24HR) 180-240 MG per extended release tablet Take 1 tablet by mouth daily 7 tablet 0    finasteride (PROPECIA) 1 MG tablet TAKE 1 TABLET BY MOUTH DAILY 90 tablet 0    FLUoxetine (PROZAC) 20 MG capsule Take 3 capsules by mouth daily 90 capsule 2    eszopiclone (LUNESTA) 3 MG TABS Take 3 mg by mouth nightly.       Armodafinil (NUVIGIL PO) Take 200 mg by mouth daily       clonazePAM (KLONOPIN) 0.5 MG tablet Take 1 tablet by mouth 2 times daily as needed for Anxiety for up to 30 days. (Patient not taking: Reported on 4/5/2021) 60 tablet 0     No current facility-administered medications for this visit. There were no vitals taken for this visit. Physical Exam    Wt Readings from Last 3 Encounters:   01/08/21 297 lb (134.7 kg)   06/29/20 297 lb (134.7 kg)   06/23/20 299 lb 13.2 oz (136 kg)       BP Readings from Last 3 Encounters:   06/23/20 (!) 161/94   06/23/20 138/69   06/08/20 (!) 157/90         Assessment/Plan:  1. Ear congestion, unspecified laterality  - fluticasone (FLONASE) 50 MCG/ACT nasal spray; 2 sprays by Each Nostril route daily  Dispense: 1 Bottle; Refill: 1  - fexofenadine-pseudoephedrine (ALLEGRA-D 24HR) 180-240 MG per extended release tablet; Take 1 tablet by mouth daily  Dispense: 7 tablet; Refill: 0    Virtual visit, unable to examine. This sounds like a middle ear effusion due to recent allergy symptoms. No concerning symptoms of a raging otitis  Sent in meds above  If worsening later into week, message me and we can use oral prednisone    Breonna Love, was evaluated through a synchronous (real-time) audio-video encounter. The patient (or guardian if applicable) is aware that this is a billable service. Verbal consent to proceed has been obtained within the past 12 months. The visit was conducted pursuant to the emergency declaration under the [facility], [address] [place] [address] authority and the [employer] and [employer] Act. Patient identification was verified, and a caregiver was present when appropriate. The patient was located in a state where the provider was credentialed to provide care. Total time spent for this encounter: not billed on time    --Matteo Freitas MD on 4/5/2021 at 2:53 PM    An electronic signature was used to authenticate this note.

## 2021-04-08 ENCOUNTER — PATIENT MESSAGE (OUTPATIENT)
Dept: FAMILY MEDICINE CLINIC | Age: 46
End: 2021-04-08

## 2021-04-08 RX ORDER — PREDNISONE 10 MG/1
TABLET ORAL
Qty: 20 TABLET | Refills: 0 | Status: SHIPPED | OUTPATIENT
Start: 2021-04-08 | End: 2022-03-15

## 2021-05-27 ENCOUNTER — HOSPITAL ENCOUNTER (OUTPATIENT)
Dept: PET IMAGING | Age: 46
Discharge: HOME | End: 2021-05-27
Payer: COMMERCIAL

## 2021-05-27 VITALS — BODY MASS INDEX: 40.82 KG/M2 | HEIGHT: 73 IN | WEIGHT: 308 LBS

## 2021-05-27 DIAGNOSIS — C82.01 FOLLICULAR LYMPHOMA GRADE I, LYMPH NODES OF HEAD, FACE, AND NECK (HCC): ICD-10-CM

## 2021-05-27 PROCEDURE — 3430000000 HC RX DIAGNOSTIC RADIOPHARMACEUTICAL: Performed by: INTERNAL MEDICINE

## 2021-05-27 PROCEDURE — 78815 PET IMAGE W/CT SKULL-THIGH: CPT

## 2021-05-27 PROCEDURE — A9552 F18 FDG: HCPCS | Performed by: INTERNAL MEDICINE

## 2021-05-27 RX ORDER — FLUDEOXYGLUCOSE F 18 200 MCI/ML
13.6 INJECTION, SOLUTION INTRAVENOUS
Status: COMPLETED | OUTPATIENT
Start: 2021-05-27 | End: 2021-05-27

## 2021-05-27 RX ADMIN — FLUDEOXYGLUCOSE F 18 13.6 MILLICURIE: 200 INJECTION, SOLUTION INTRAVENOUS at 10:52

## 2021-05-31 DIAGNOSIS — F39 MOOD DISORDER (HCC): ICD-10-CM

## 2021-06-01 RX ORDER — FLUOXETINE HYDROCHLORIDE 20 MG/1
60 CAPSULE ORAL DAILY
Qty: 90 CAPSULE | Refills: 2 | Status: SHIPPED | OUTPATIENT
Start: 2021-06-01 | End: 2022-01-14 | Stop reason: SDUPTHER

## 2021-06-09 RX ORDER — FINASTERIDE 1 MG/1
TABLET, FILM COATED ORAL
Qty: 90 TABLET | Refills: 0 | Status: SHIPPED | OUTPATIENT
Start: 2021-06-09 | End: 2021-09-02

## 2021-08-20 ENCOUNTER — PATIENT MESSAGE (OUTPATIENT)
Dept: FAMILY MEDICINE CLINIC | Age: 46
End: 2021-08-20

## 2021-08-20 RX ORDER — ATORVASTATIN CALCIUM 10 MG/1
10 TABLET, FILM COATED ORAL DAILY
Qty: 30 TABLET | Refills: 3 | Status: SHIPPED | OUTPATIENT
Start: 2021-08-20 | End: 2022-03-21

## 2021-08-20 NOTE — TELEPHONE ENCOUNTER
From: Claudia Ospina  To: Connie Choudhary MD  Sent: 8/20/2021 3:42 PM EDT  Subject: Non-Urgent Medical Question    Regarding our previous discussion about my narrowing arteries, I hate the idea of cholesterol lowering medicine, but it's several rungs above narrowing arteries on my wish list. I'm thinking maybe if you call in a medicine now and then I'll schedule a check up and we can discuss it then. Also is there another way to determine how clogged I am? Did my second PET scan show the same calcium deposits? And what would be the symptoms I'd experience if I were too clogged? Other than death, of course. Because I have been experiencing intermittently what I have assumed was too much caffeine or a panic attack. Not sure how to describe it except that it feels my heart is beating fast when I'm just sitting or lying in bed. Accompanied by slight to moderate anxiety. More of a lite panic attack. I wouldn't have mentioned it except I was typing this and I thought of it. I've cut my caffeine consumption this week, which wasn't very much to begin with (two 16 oz diet sodas a day), and it seems to not happening so much.     Thanks,  [employer]

## 2021-09-02 RX ORDER — FINASTERIDE 1 MG/1
TABLET, FILM COATED ORAL
Qty: 90 TABLET | Refills: 0 | Status: SHIPPED | OUTPATIENT
Start: 2021-09-02 | End: 2022-01-17 | Stop reason: SDUPTHER

## 2022-01-17 ENCOUNTER — OFFICE VISIT (OUTPATIENT)
Dept: FAMILY MEDICINE CLINIC | Age: 47
End: 2022-01-17
Payer: COMMERCIAL

## 2022-01-17 VITALS
WEIGHT: 304.6 LBS | HEIGHT: 73 IN | RESPIRATION RATE: 18 BRPM | DIASTOLIC BLOOD PRESSURE: 76 MMHG | SYSTOLIC BLOOD PRESSURE: 128 MMHG | OXYGEN SATURATION: 96 % | HEART RATE: 78 BPM | BODY MASS INDEX: 40.37 KG/M2

## 2022-01-17 DIAGNOSIS — K43.2 INCISIONAL HERNIA, WITHOUT OBSTRUCTION OR GANGRENE: Primary | ICD-10-CM

## 2022-01-17 DIAGNOSIS — L64.9 MALE PATTERN BALDNESS: ICD-10-CM

## 2022-01-17 PROCEDURE — 99213 OFFICE O/P EST LOW 20 MIN: CPT | Performed by: FAMILY MEDICINE

## 2022-01-17 RX ORDER — FINASTERIDE 1 MG/1
TABLET, FILM COATED ORAL
Qty: 90 TABLET | Refills: 1 | Status: SHIPPED | OUTPATIENT
Start: 2022-01-17 | End: 2022-07-18

## 2022-01-17 NOTE — PROGRESS NOTES
128/76 (Site: Right Upper Arm, Position: Sitting, Cuff Size: Large Adult)   Pulse 78   Resp 18   Ht 6' 1\" (1.854 m)   Wt (!) 304 lb 9.6 oz (138.2 kg)   SpO2 96%   BMI 40.19 kg/m²     Physical Exam  Abdominal:      Comments: Bulge with Valsalva just left of midline abdominal scar         Wt Readings from Last 3 Encounters:   01/17/22 (!) 304 lb 9.6 oz (138.2 kg)   05/27/21 (!) 308 lb (139.7 kg)   01/08/21 297 lb (134.7 kg)       BP Readings from Last 3 Encounters:   01/17/22 128/76   06/23/20 (!) 161/94   06/23/20 138/69       Assessment/Plan:  1. Incisional hernia, without obstruction or gangrene  Referral for surgical repair  - Kumar Humphreys MD, General Surgery, [facility]    2. Male pattern baldness  Doing well on finasteride  - finasteride (PROPECIA) 1 MG tablet; TAKE 1 TABLET BY MOUTH DAILY  Dispense: 90 tablet;  Refill: 1

## 2022-02-14 ENCOUNTER — OFFICE VISIT (OUTPATIENT)
Dept: SURGERY | Age: 47
End: 2022-02-14
Payer: COMMERCIAL

## 2022-02-14 VITALS
DIASTOLIC BLOOD PRESSURE: 85 MMHG | WEIGHT: 305 LBS | SYSTOLIC BLOOD PRESSURE: 135 MMHG | HEART RATE: 74 BPM | BODY MASS INDEX: 40.24 KG/M2

## 2022-02-14 DIAGNOSIS — K43.2 INCISIONAL HERNIA, WITHOUT OBSTRUCTION OR GANGRENE: Primary | ICD-10-CM

## 2022-02-14 PROCEDURE — 99205 OFFICE O/P NEW HI 60 MIN: CPT | Performed by: SURGERY

## 2022-02-14 RX ORDER — BUPROPION HYDROCHLORIDE 150 MG/1
150 TABLET ORAL EVERY MORNING
COMMUNITY

## 2022-02-14 NOTE — Clinical Note
Poonam Guadalupe,    I just saw Mr. Riley Danielle in the office for his incisional hernia. I am going to repair it robotically in the near future. Would you mind seeing him for a preoperative H&P? He is supposed to call your office to set up an appointment. Thank you for allowing me to take care of Mr. Jose Luis Finley with you.     Tani Haque

## 2022-02-14 NOTE — PATIENT INSTRUCTIONS
Surgeon: Hilary Fuller MD     Your surgery will be at [facility] [place]  First floor of the [address] [place] [address]. Kyra [address]    Date:    Arrival time:    Surgery time:     (   ) Outpatient with general anesthesia     (   ) Outpatient with IV sedation     You will need to have a  drive you home due to anesthesia. Nothing to eat or drink after midnight the night before. FASTING SURGERY    Patient is to remain off work for 2 weeks after surgery. 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 If LIGHT DUTY is not allowed, patient must remain off work for the full 6 weeks. COVID-19 testing protocol  If COVID+ test, need to wait 4 weeks before rescheduling surgery     Pre-operative COVID testing is required for ALL patients within 6 days of surgery    [facility] testing site: Mon- Fri 12-4pm  Go to the Emergency room parking lot and follow the drive way to the COVID testing. Stay in your vehicle. OR  Schedule at [employer], [employer], [employer]. Fax results to [phone] or email to [email or web address]    First date to obtain testing : __________________      Marietta Wilhelm may be asked to stop blood thinners 5 days prior to surgery such as Coumadin, Plavix, Kathrene Citlali, Eliquis,and over the counter fish oil (Omega 3)     The surgery scheduler, Pati Herbert will call you a few days prior to surgery to confirm date and time if any changes. Please make a History and Physical (surgery clearance) by your primary care physician prior to your surgery date. *Within 30 days of surgery*     Please contact Cara if you need to reschedule your surgery or have any questions.   Office phone number:     [phone]  Fax number for [facility]:    [phone]

## 2022-02-14 NOTE — LETTER
Surgery Scheduling Form:    DEMOGRAPHICS:                                                                                                         .    Patient Name:  Candi Fonseca  Patient :  1975   Patient SS#:      Patient Phone:  [phone] (home) [phone] (work) Alt. Patient Phone:                     Patient Address:  [address] [id number]    PCP:  Chio Pan MD  Insurance:  Payor: Torie Gill / Plan: Kecia Edward / Product Type: *No Product type* /   Insurance ID Number:    Payor/Plan Subscr  Sex Relation Sub. Ins. ID Effective Group Num   [address]. [address] 1975 Male Self [id number] 6/1/15 [id number]                                   [address] [place] [address] [address]       DIAGNOSIS & PROCEDURE:                                                                                       .    Diagnosis:   K43.2 Incisional hernia, without obstruction or gangrene    Operation:  Robotic-assisted Laparoscopic Incisional Hernia Repair with Mesh, Possible Open   Location: [facility]   Surgeon:    Delmi Esquivel MD        [facility] INFORMATION:                                                                                    .    Surgeon's Scheduling Instruction:  elective  Requested Date: 3-2-2022     OR Time:           Patient Arrival Time:   OR Time Required:  90  Minutes  Anesthesia:  General  Equipment:                                                             SA Required:  Yes     Status:  Outpatient           Standard C-Arm:  No   PAT Required:   Yes                               Best Time to Call:  ANY  Patient Requested to see PCP for Pre-op H & P:  No   Special Comments:      COVID testing AM of surgery (Due to work)                       Delmi Esquivel MD    02/15/22 at 13:74 AM      PRE-CERTIFICATION INFORMATION:                                                                           .    Procedure:       CPT Code Modifier          58165

## 2022-02-14 NOTE — PROGRESS NOTES
New Patient [facility] General and Vascular Surgery   Abbi Gage MD    [address], [address] [facility]  [facility]  Phone: [phone]  Fax: [phone]    Alma Rosa Hercules   YOB: 1975    Date of Visit:  2/14/2022    No ref. provider found  Matti Jackson MD    HPI:   Incisional Hernia: Alma Rosa Hercules is a 48 y.o. male seen at request of Matti Jackson MD.  He presents for evaluation of an incisional hernia. Symptoms were first noted a few years ago. Pain is intermittent, worse with movement. The size of the hernia fluctuates but he is unaware if it reduces. He denies any nausea and vomiting, change in bowel habits, or redness around the area of the hernia. He has a history of exploratory laparotomy for perforated appendicitis at age 14, with take back through the same incision 3 months afterwards for a bowel obstruction. He also has a history of follicular lymphoma of the cervical lymph nodes. The patient is in remission. Allergies   Allergen Reactions    Ampicillin     Cipro Xr      Outpatient Medications Marked as Taking for the 2/14/22 encounter (Office Visit) with Lucius Caldwell MD   Medication Sig Dispense Refill    buPROPion (WELLBUTRIN XL) 150 MG extended release tablet Take 150 mg by mouth every morning      finasteride (PROPECIA) 1 MG tablet TAKE 1 TABLET BY MOUTH DAILY 90 tablet 1    FLUoxetine (PROZAC) 20 MG capsule Take 3 capsules by mouth daily 90 capsule 2    atorvastatin (LIPITOR) 10 MG tablet Take 1 tablet by mouth daily 30 tablet 3    predniSONE (DELTASONE) 10 MG tablet Take 4 tabs by mouth daily for 2 days, 3 tabs for 2 days, 2 tabs for 2 days, 1 tab for 2 days 20 tablet 0    fexofenadine-pseudoephedrine (ALLEGRA-D 24HR) 180-240 MG per extended release tablet Take 1 tablet by mouth daily 7 tablet 0    eszopiclone (LUNESTA) 3 MG TABS Take 3 mg by mouth nightly.      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) - s/p excision, watchful waiting,  7/9/2020    Hearing loss     Pt states his hearing is within the normal range    Rash     Sleep apnea     Tinnitus      Past Surgical History:   Procedure Laterality Date    ABDOMEN SURGERY      exploratory    APPENDECTOMY      NECK SURGERY N/A 6/23/2020    EXCISION RIGHT CERVICAL LYMPH NODE performed by Beni Aguirre MD at [facility] History   Problem Relation Age of Onset    Heart Disease Father     Hypertension Father     Hypertension Brother     Diabetes Paternal Grandmother     Colon Cancer Neg Hx      Social History     Socioeconomic History    Marital status:      Spouse name: Janette Poster Number of children: 0    Years of education: Not on file    Highest education level: Not on file   Occupational History    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 Vaping Use    Vaping Use: Never used   Substance and Sexual Activity    Alcohol use: Never     Alcohol/week: 0.0 standard drinks    Drug use: No    Sexual activity: Yes     Partners: Female   Other Topics Concern    Not on file   Social History Narrative    Not on file     Social Determinants of Health     Financial Resource Strain:     Difficulty of Paying Living Expenses: Not on file   Food Insecurity:     Worried About Running Out of Food in the Last Year: Not on file    Lanny of Food in the Last Year: Not on file   Transportation Needs:     Lack of Transportation (Medical): Not on file    Lack of Transportation (Non-Medical):  Not on file   Physical Activity:     Days of Exercise per Week: Not on file    Minutes of Exercise per Session: Not on file Stress:     Feeling of Stress : Not on file   Social Connections:     Frequency of Communication with Friends and Family: Not on file    Frequency of Social Gatherings with Friends and Family: Not on file    Attends Hoahaoism Services: Not on file    Active Member of Clubs or Organizations: Not on file    Attends Club or Organization Meetings: Not on file    Marital Status: Not on file   Intimate Partner Violence:     Fear of Current or Ex-Partner: Not on file    Emotionally Abused: Not on file    Physically Abused: Not on file    Sexually Abused: Not on file   Housing Stability:     Unable to Pay for Housing in the Last Year: Not on file    Number of [place] in the Last Year: Not on file    Unstable Housing in the Last Year: Not on file          Vitals:    02/14/22 1416   BP: 135/85   Pulse: 74   Weight: (!) 305 lb (138.3 kg)     Body mass index is 40.24 kg/m². Wt Readings from Last 3 Encounters:   02/14/22 (!) 305 lb (138.3 kg)   01/17/22 (!) 304 lb 9.6 oz (138.2 kg)   05/27/21 (!) 308 lb (139.7 kg)     BP Readings from Last 3 Encounters:   02/14/22 135/85   01/17/22 128/76   06/23/20 (!) 161/94          REVIEW OF SYSTEMS:   Pertinent positives are in HPI, otherwise all systems reviewed and negative    PHYSICAL EXAM:    CONSTITUTIONAL:  awake, alert, no apparent distress and morbidly obese  ENT:  normocephalic, without obvious abnormality  NECK:  supple, symmetrical, trachea midline   LUNGS:  Resp easy and unlabored  CARDIOVASCULAR:  regular rate and rhythm  ABDOMEN:  Well healed midline scar extending above and below the umbilicus, soft, non-distended, non-tender, voluntary guarding absent,  Examination for hernias: partially reducible incisional hernia in epigastrium, along with smaller hernia at umbilicus, no overlying skin changes.     MUSCULOSKELETAL: No edema  NEUROLOGIC:  Mental Status Exam:  Level of Alertness:   awake  Orientation:   person, place, time      DATA:  PET CT from 5/27/21 personally reviewed with patient, showing multiple fascial defects both at the umbilicus and in upper midline    ASSESSMENT:     Diagnosis Orders   1. Incisional hernia, without obstruction or gangrene           PLAN:    We will schedule the patient for robotic assisted laparoscopic incisional hernia repair with mesh, possible open. The technical aspects, risks, benefits and complications of the procedure were discussed with the patient. The patient appears to understand, asks appropriate questions, and agrees to proceed with the procedure. He is going to call Dr. Kurt Weiss for preoperative clearance.       Electronically signed by Deidra Joyce MD on 2/14/2022

## 2022-02-14 NOTE — LETTER
CONSENT TO OPERATION             Robotic assisted laparoscopic incisional hernia repair with mesh, possible open     PATIENT : David Harris  YOB: 1975             DATE : 2/14/22    1. I request and consent that Dr. Deborah Loredo and/or his associates or assistants perform an operation and/or procedures on the above patient at [facility], to treat the condition(s) which appear indicated by the diagnostic studies already performed. 2. It has been explained to me by the informing physician that during the course of the operation and/or procedure(s) unforeseen conditions may be revealed that necessitate an extension of the original operation and/or procedure(s) or different operation and/or procedures than those set forth in Paragraph 1. I therefore authorize and request that my physician and/or his associates or assistants perform such operations and/or procedures as are necessary and desirable in the exercise of professional judgment. The authority granted under this Paragraph 2 shall extend to all conditions that require treatment and are known to my physician at the time the operation is commenced. 3. I have been made aware by the informing physician of certain risks and consequences that are associated with the operation and/or procedure(s) described in Paragraph 1, the reasonable alternative methods or treatment, the possible consequences, the possibility that the operation and/or procedure(s) may be unsuccessful and the possibility of complications. I understand the reasonably known risks to be:  - Bleeding  - Infection  - Poor Healing  - Possible Damage to Nerve, Vessel, Tendon/Muscle or Bone  - Need for further Treatment/Surgery  - Stiffness  - Pain  - Residual or Recurrent Symptoms  - Anesthetic and/or Medical Risks     4.  I have also been informed by the informing physician that there are other risks from both known and unknown causes that are attendant to the performance of any surgical procedure. I am aware that the practice of medicine and surgery is not an exact science, and that no guarantees have been made to me concerning the results of the operation and/or procedure(s). 5. I consent to the administration of anesthesia and to the use of such anesthetics as may be deemed advisable by the anesthesiologist who has been engaged by me or my physician. 6. I certify that I have read and understand the above consent to operation and/or other procedure(s); that the explanations therein referred to were made to me by the informing physician in advance of my signing this consent; that all blanks or statements requiring insertion or completion were filled in and inapplicable paragraphs, if any, were stricken before I signed; and that all questions asked by me about the operation and/or procedure(s) which I have consented to have been fully answered in a satisfactory manner.            2/14/22                    _______________________  Signature Of Patient   Date              Witness                     OR Date:  ___________

## 2022-02-22 ENCOUNTER — HOSPITAL ENCOUNTER (OUTPATIENT)
Dept: PET IMAGING | Age: 47
Discharge: HOME | End: 2022-02-22
Payer: COMMERCIAL

## 2022-02-22 DIAGNOSIS — C82.01 FOLLICULAR LYMPHOMA GRADE I, LYMPH NODES OF HEAD, FACE, AND NECK (HCC): ICD-10-CM

## 2022-02-22 PROCEDURE — A9552 F18 FDG: HCPCS | Performed by: INTERNAL MEDICINE

## 2022-02-22 PROCEDURE — 3430000000 HC RX DIAGNOSTIC RADIOPHARMACEUTICAL: Performed by: INTERNAL MEDICINE

## 2022-02-22 PROCEDURE — 78815 PET IMAGE W/CT SKULL-THIGH: CPT

## 2022-02-22 RX ORDER — FLUDEOXYGLUCOSE F 18 200 MCI/ML
12.58 INJECTION, SOLUTION INTRAVENOUS
Status: COMPLETED | OUTPATIENT
Start: 2022-02-22 | End: 2022-02-22

## 2022-02-22 RX ADMIN — FLUDEOXYGLUCOSE F 18 12.58 MILLICURIE: 200 INJECTION, SOLUTION INTRAVENOUS at 09:21

## 2022-03-15 ENCOUNTER — ANESTHESIA EVENT (OUTPATIENT)
Dept: OPERATING ROOM | Age: 47
End: 2022-03-15
Payer: COMMERCIAL

## 2022-03-15 ENCOUNTER — OFFICE VISIT (OUTPATIENT)
Dept: FAMILY MEDICINE CLINIC | Age: 47
End: 2022-03-15
Payer: COMMERCIAL

## 2022-03-15 VITALS
RESPIRATION RATE: 14 BRPM | DIASTOLIC BLOOD PRESSURE: 74 MMHG | HEIGHT: 73 IN | HEART RATE: 68 BPM | SYSTOLIC BLOOD PRESSURE: 128 MMHG | TEMPERATURE: 98.6 F | WEIGHT: 309.2 LBS | OXYGEN SATURATION: 95 % | BODY MASS INDEX: 40.98 KG/M2

## 2022-03-15 DIAGNOSIS — Z01.818 PREOP EXAMINATION: Primary | ICD-10-CM

## 2022-03-15 DIAGNOSIS — K43.2 INCISIONAL HERNIA, WITHOUT OBSTRUCTION OR GANGRENE: ICD-10-CM

## 2022-03-15 DIAGNOSIS — G47.33 OSA ON CPAP: ICD-10-CM

## 2022-03-15 DIAGNOSIS — C82.11 GRADE 2 FOLLICULAR LYMPHOMA OF LYMPH NODES OF NECK (HCC): ICD-10-CM

## 2022-03-15 DIAGNOSIS — Z99.89 OSA ON CPAP: ICD-10-CM

## 2022-03-15 PROCEDURE — 93000 ELECTROCARDIOGRAM COMPLETE: CPT | Performed by: NURSE PRACTITIONER

## 2022-03-15 PROCEDURE — 99213 OFFICE O/P EST LOW 20 MIN: CPT | Performed by: NURSE PRACTITIONER

## 2022-03-15 ASSESSMENT — PATIENT HEALTH QUESTIONNAIRE - PHQ9
1. LITTLE INTEREST OR PLEASURE IN DOING THINGS: 0
10. IF YOU CHECKED OFF ANY PROBLEMS, HOW DIFFICULT HAVE THESE PROBLEMS MADE IT FOR YOU TO DO YOUR WORK, TAKE CARE OF THINGS AT HOME, OR GET ALONG WITH OTHER PEOPLE: 0
2. FEELING DOWN, DEPRESSED OR HOPELESS: 0
5. POOR APPETITE OR OVEREATING: 0
SUM OF ALL RESPONSES TO PHQ QUESTIONS 1-9: 0
9. THOUGHTS THAT YOU WOULD BE BETTER OFF DEAD, OR OF HURTING YOURSELF: 0
SUM OF ALL RESPONSES TO PHQ QUESTIONS 1-9: 0
8. MOVING OR SPEAKING SO SLOWLY THAT OTHER PEOPLE COULD HAVE NOTICED. OR THE OPPOSITE, BEING SO FIGETY OR RESTLESS THAT YOU HAVE BEEN MOVING AROUND A LOT MORE THAN USUAL: 0
SUM OF ALL RESPONSES TO PHQ QUESTIONS 1-9: 0
3. TROUBLE FALLING OR STAYING ASLEEP: 0
6. FEELING BAD ABOUT YOURSELF - OR THAT YOU ARE A FAILURE OR HAVE LET YOURSELF OR YOUR FAMILY DOWN: 0
4. FEELING TIRED OR HAVING LITTLE ENERGY: 0
SUM OF ALL RESPONSES TO PHQ9 QUESTIONS 1 & 2: 0
SUM OF ALL RESPONSES TO PHQ QUESTIONS 1-9: 0
7. TROUBLE CONCENTRATING ON THINGS, SUCH AS READING THE NEWSPAPER OR WATCHING TELEVISION: 0

## 2022-03-15 NOTE — PROGRESS NOTES
Preoperative Consultation      Dallas Crump  YOB: 1975    Date of Service:  3/15/2022    Vitals:    03/15/22 1304   BP: 128/74   Site: Right Upper Arm   Position: Sitting   Cuff Size: Large Adult   Pulse: 68   Resp: 14   Temp: 98.6 °F (37 °C)   TempSrc: Oral   SpO2: 95%   Weight: (!) 309 lb 3.2 oz (140.3 kg)   Height: 6' 1\" (1.854 m)      Wt Readings from Last 2 Encounters:   03/15/22 (!) 309 lb 3.2 oz (140.3 kg)   02/14/22 (!) 305 lb (138.3 kg)     BP Readings from Last 3 Encounters:   03/15/22 128/74   02/14/22 135/85   01/17/22 128/76        Chief Complaint   Patient presents with    Pre-op Exam     3/16. hernia repair. dr Chao Dawkins. Allergies   Allergen Reactions    Ampicillin     Cipro Xr      Outpatient Medications Marked as Taking for the 3/15/22 encounter (Office Visit) with DESTINEE Abdi - CNP   Medication Sig Dispense Refill    buPROPion (WELLBUTRIN XL) 150 MG extended release tablet Take 150 mg by mouth every morning      finasteride (PROPECIA) 1 MG tablet TAKE 1 TABLET BY MOUTH DAILY 90 tablet 1    atorvastatin (LIPITOR) 10 MG tablet Take 1 tablet by mouth daily 30 tablet 3    eszopiclone (LUNESTA) 3 MG TABS Take 3 mg by mouth nightly.  Armodafinil (NUVIGIL PO) Take 200 mg by mouth daily          This patient presents to the office today for a preoperative consultation at the request of surgeon, Dr. Dockery, who plans on performing robotic assisted laparoscopic incisional hernia repair with mesh on March 16, 2022 at [facility].  The current problem began 2 years ago, and symptoms have been worsening with time. Has history of exploratory laparotomy for perforated appendicitis when he was a teenager. 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 Bren Rodriguez and recently seen earlier this month. CBC normal 3/2/22.    Monitoring symptoms and PET scans. Patient reports that they have discussed starting chemo after he has his hernia corrected. 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) - s/p excision, watchful waiting,        Past Medical History:   Diagnosis Date    Allergic rhinitis     Depression     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) - s/p excision, watchful waiting,  7/9/2020    Hearing loss     Pt states his hearing is within the normal range    Rash     Sleep apnea     Tinnitus      Past Surgical History:   Procedure Laterality Date    ABDOMEN SURGERY      exploratory    APPENDECTOMY      NECK SURGERY N/A 6/23/2020    EXCISION RIGHT CERVICAL LYMPH NODE performed [facility] at [facility] History   Problem Relation Age of Onset    Heart Disease Father     Hypertension Father     Hypertension Brother     Diabetes Paternal Grandmother     Colon Cancer Neg Hx      Social History     Socioeconomic History    Marital status:      Spouse name: Mireille Franklin Number of children: 0    Years of education: Not on file    Highest education level: Not on file   Occupational History    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    Vaping Use    Vaping Use: Never used   Substance and Sexual Activity    Alcohol use: Never     Alcohol/week: 0.0 standard drinks    Drug use: No       Review of Systems  A comprehensive review of systems was negative except for what was noted in the HPI. Physical Exam   Constitutional: He is oriented to person, place, and time. He appears well-developed and well-nourished. No distress. HENT:   Head: Normocephalic and atraumatic. Mouth/Throat: Uvula is midline, oropharynx is clear and moist and mucous membranes are normal.   Eyes: Conjunctivae and EOM are normal. Pupils are equal, round, and reactive to light. Neck: Trachea normal and normal range of motion. Neck supple. No JVD present. Carotid bruit is not present. One enlarged right cervical lymph node. Cardiovascular: Normal rate, regular rhythm, normal heart sounds and intact distal pulses. Exam reveals no gallop and no friction rub. No murmur heard. Pulmonary/Chest: Effort normal and breath sounds normal. No respiratory distress. He has no wheezes. He has no rales. Abdominal: Soft. No tenderness. Midline surgical scar. Soft reducible hernia noted in midline surgical scar. Musculoskeletal: He exhibits no edema. Neurological: He is alert and oriented to person, place, and time. He has normal strength. No cranial nerve deficit or sensory deficit. Coordination and gait normal.   Skin: Skin is warm and dry. No rash noted. No erythema. Psychiatric: He has a normal mood and affect. His behavior is normal.     EKG Interpretation:  normal sinus rhythm, there are no previous tracings available for comparison. Lab Review   Lab Results   Component Value Date     01/08/2021    K 5.1 01/08/2021     01/08/2021    CO2 30 01/08/2021    BUN 14 01/08/2021    CREATININE 0.9 01/08/2021    GLUCOSE 93 01/08/2021    CALCIUM 9.2 01/08/2021     Lab Results   Component Value Date    WBC 5.3 01/08/2021    HGB 15.5 01/08/2021    HCT 45.4 01/08/2021    MCV 89.0 01/08/2021     01/08/2021      CBC on 3/2/22 was normal in hematologist office.       Assessment: 48 y.o. patient with planned surgery as above. Known risk factors for perioperative complications: Obstructive sleep apnea  Current medications which may produce withdrawal symptoms if withheld perioperatively: none      Plan:     1. Preoperative workup as follows: ECG  2. Change in medication regimen before surgery: Hold all medications on morning of surgery  3. Prophylaxis for cardiac events with perioperative beta-blockers: Not indicated  ACC/AHA indications for pre-operative beta-blocker use:    · Vascular surgery with history of postitive stress test  · Intermediate or high risk surgery with history of CAD   · Intermediate or high risk surgery with multiple clinical predictors of CAD- 2 of the following: history of compensated or prior heart failure, history of cerebrovascular disease, DM, or renal insufficiency    Routine administration of higher-dose, long-acting metoprolol in beta-blockernaïve patients on the day of surgery, and in the absence of dose titration is associated with an overall increase in mortality. Beta-blockers should be started days to weeks prior to surgery and titrated to pulse < 70.  4. Deep vein thrombosis prophylaxis: regimen to be chosen by surgical team  5. No contraindications to planned surgery of robotic assisted laparoscopic incisional hernia repair with mesh with Dr. Uyen Alvarez on 3/16/22.   2. NAYELY- stable on cpap  3. 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 Panda Mitchell. Continue treatment plan from Dr. Panda Mitchell.

## 2022-03-16 ENCOUNTER — HOSPITAL ENCOUNTER (OUTPATIENT)
Age: 47
Setting detail: OUTPATIENT SURGERY
Discharge: HOME | End: 2022-03-16
Attending: SURGERY | Admitting: SURGERY
Payer: COMMERCIAL

## 2022-03-16 ENCOUNTER — ANESTHESIA (OUTPATIENT)
Dept: OPERATING ROOM | Age: 47
End: 2022-03-16
Payer: COMMERCIAL

## 2022-03-16 VITALS
DIASTOLIC BLOOD PRESSURE: 74 MMHG | SYSTOLIC BLOOD PRESSURE: 134 MMHG | OXYGEN SATURATION: 94 % | RESPIRATION RATE: 25 BRPM | TEMPERATURE: 97.3 F

## 2022-03-16 VITALS
SYSTOLIC BLOOD PRESSURE: 134 MMHG | WEIGHT: 305.45 LBS | RESPIRATION RATE: 18 BRPM | DIASTOLIC BLOOD PRESSURE: 96 MMHG | OXYGEN SATURATION: 93 % | BODY MASS INDEX: 39.2 KG/M2 | TEMPERATURE: 98.3 F | HEART RATE: 90 BPM | HEIGHT: 74 IN

## 2022-03-16 DIAGNOSIS — K43.2 INCISIONAL HERNIA, WITHOUT OBSTRUCTION OR GANGRENE: Primary | ICD-10-CM

## 2022-03-16 LAB — SARS-COV-2, NAAT: NOT DETECTED

## 2022-03-16 PROCEDURE — C9290 INJ, BUPIVACAINE LIPOSOME: HCPCS | Performed by: SURGERY

## 2022-03-16 PROCEDURE — 2500000003 HC RX 250 WO HCPCS: Performed by: NURSE ANESTHETIST, CERTIFIED REGISTERED

## 2022-03-16 PROCEDURE — 7100000011 HC PHASE II RECOVERY - ADDTL 15 MIN: Performed by: SURGERY

## 2022-03-16 PROCEDURE — 3700000001 HC ADD 15 MINUTES (ANESTHESIA): Performed by: SURGERY

## 2022-03-16 PROCEDURE — 3700000000 HC ANESTHESIA ATTENDED CARE: Performed by: SURGERY

## 2022-03-16 PROCEDURE — 87635 SARS-COV-2 COVID-19 AMP PRB: CPT

## 2022-03-16 PROCEDURE — 2580000003 HC RX 258: Performed by: SURGERY

## 2022-03-16 PROCEDURE — 2580000003 HC RX 258: Performed by: NURSE ANESTHETIST, CERTIFIED REGISTERED

## 2022-03-16 PROCEDURE — 6360000002 HC RX W HCPCS: Performed by: NURSE ANESTHETIST, CERTIFIED REGISTERED

## 2022-03-16 PROCEDURE — 7100000001 HC PACU RECOVERY - ADDTL 15 MIN: Performed by: SURGERY

## 2022-03-16 PROCEDURE — 2500000003 HC RX 250 WO HCPCS: Performed by: SURGERY

## 2022-03-16 PROCEDURE — 2709999900 HC NON-CHARGEABLE SUPPLY: Performed by: SURGERY

## 2022-03-16 PROCEDURE — A4217 STERILE WATER/SALINE, 500 ML: HCPCS | Performed by: SURGERY

## 2022-03-16 PROCEDURE — 7100000000 HC PACU RECOVERY - FIRST 15 MIN: Performed by: SURGERY

## 2022-03-16 PROCEDURE — 3600000009 HC SURGERY ROBOT BASE: Performed by: SURGERY

## 2022-03-16 PROCEDURE — 6360000002 HC RX W HCPCS: Performed by: SURGERY

## 2022-03-16 PROCEDURE — 3600000019 HC SURGERY ROBOT ADDTL 15MIN: Performed by: SURGERY

## 2022-03-16 PROCEDURE — 7100000010 HC PHASE II RECOVERY - FIRST 15 MIN: Performed by: SURGERY

## 2022-03-16 PROCEDURE — S2900 ROBOTIC SURGICAL SYSTEM: HCPCS | Performed by: SURGERY

## 2022-03-16 PROCEDURE — C1781 MESH (IMPLANTABLE): HCPCS | Performed by: SURGERY

## 2022-03-16 PROCEDURE — 49654 PR LAP, INCISIONAL HERNIA REPAIR,REDUCIBLE: CPT | Performed by: SURGERY

## 2022-03-16 DEVICE — MESH SURG W8XL10IN UNCOATED MFIL POLYPR ABSRB HYDRGEL LO: Type: IMPLANTABLE DEVICE | Site: ABDOMEN | Status: FUNCTIONAL

## 2022-03-16 RX ORDER — LIDOCAINE HYDROCHLORIDE 20 MG/ML
INJECTION, SOLUTION EPIDURAL; INFILTRATION; INTRACAUDAL; PERINEURAL PRN
Status: DISCONTINUED | OUTPATIENT
Start: 2022-03-16 | End: 2022-03-16 | Stop reason: SDUPTHER

## 2022-03-16 RX ORDER — OXYCODONE HYDROCHLORIDE AND ACETAMINOPHEN 5; 325 MG/1; MG/1
1-2 TABLET ORAL EVERY 6 HOURS PRN
Qty: 28 TABLET | Refills: 0 | Status: SHIPPED | OUTPATIENT
Start: 2022-03-16 | End: 2022-03-23

## 2022-03-16 RX ORDER — MAGNESIUM HYDROXIDE 1200 MG/15ML
LIQUID ORAL CONTINUOUS PRN
Status: COMPLETED | OUTPATIENT
Start: 2022-03-16 | End: 2022-03-16

## 2022-03-16 RX ORDER — IBUPROFEN 600 MG/1
600 TABLET ORAL 4 TIMES DAILY PRN
Qty: 120 TABLET | Refills: 0 | Status: SHIPPED | OUTPATIENT
Start: 2022-03-16

## 2022-03-16 RX ORDER — SODIUM CHLORIDE 0.9 % (FLUSH) 0.9 %
5-40 SYRINGE (ML) INJECTION PRN
Status: DISCONTINUED | OUTPATIENT
Start: 2022-03-16 | End: 2022-03-16 | Stop reason: HOSPADM

## 2022-03-16 RX ORDER — SUCCINYLCHOLINE/SOD CL,ISO/PF 200MG/10ML
SYRINGE (ML) INTRAVENOUS PRN
Status: DISCONTINUED | OUTPATIENT
Start: 2022-03-16 | End: 2022-03-16 | Stop reason: SDUPTHER

## 2022-03-16 RX ORDER — OXYCODONE HYDROCHLORIDE AND ACETAMINOPHEN 5; 325 MG/1; MG/1
1 TABLET ORAL
Status: DISCONTINUED | OUTPATIENT
Start: 2022-03-16 | End: 2022-03-16 | Stop reason: HOSPADM

## 2022-03-16 RX ORDER — KETOROLAC TROMETHAMINE 30 MG/ML
INJECTION, SOLUTION INTRAMUSCULAR; INTRAVENOUS PRN
Status: DISCONTINUED | OUTPATIENT
Start: 2022-03-16 | End: 2022-03-16 | Stop reason: SDUPTHER

## 2022-03-16 RX ORDER — METOPROLOL TARTRATE 5 MG/5ML
INJECTION INTRAVENOUS PRN
Status: DISCONTINUED | OUTPATIENT
Start: 2022-03-16 | End: 2022-03-16 | Stop reason: SDUPTHER

## 2022-03-16 RX ORDER — ONDANSETRON 2 MG/ML
INJECTION INTRAMUSCULAR; INTRAVENOUS PRN
Status: DISCONTINUED | OUTPATIENT
Start: 2022-03-16 | End: 2022-03-16 | Stop reason: SDUPTHER

## 2022-03-16 RX ORDER — GLYCOPYRROLATE 0.2 MG/ML
INJECTION INTRAMUSCULAR; INTRAVENOUS PRN
Status: DISCONTINUED | OUTPATIENT
Start: 2022-03-16 | End: 2022-03-16 | Stop reason: SDUPTHER

## 2022-03-16 RX ORDER — PROPOFOL 10 MG/ML
INJECTION, EMULSION INTRAVENOUS PRN
Status: DISCONTINUED | OUTPATIENT
Start: 2022-03-16 | End: 2022-03-16 | Stop reason: SDUPTHER

## 2022-03-16 RX ORDER — EPHEDRINE SULFATE/0.9% NACL/PF 50 MG/5 ML
SYRINGE (ML) INTRAVENOUS PRN
Status: DISCONTINUED | OUTPATIENT
Start: 2022-03-16 | End: 2022-03-16 | Stop reason: SDUPTHER

## 2022-03-16 RX ORDER — DOCUSATE SODIUM 100 MG/1
100 CAPSULE, LIQUID FILLED ORAL 2 TIMES DAILY PRN
Qty: 60 CAPSULE | Refills: 0 | Status: SHIPPED | OUTPATIENT
Start: 2022-03-16

## 2022-03-16 RX ORDER — FENTANYL CITRATE 50 UG/ML
25 INJECTION, SOLUTION INTRAMUSCULAR; INTRAVENOUS EVERY 5 MIN PRN
Status: DISCONTINUED | OUTPATIENT
Start: 2022-03-16 | End: 2022-03-16 | Stop reason: HOSPADM

## 2022-03-16 RX ORDER — KETAMINE HCL IN NACL, ISO-OSM 100MG/10ML
SYRINGE (ML) INJECTION PRN
Status: DISCONTINUED | OUTPATIENT
Start: 2022-03-16 | End: 2022-03-16 | Stop reason: SDUPTHER

## 2022-03-16 RX ORDER — FENTANYL CITRATE 50 UG/ML
INJECTION, SOLUTION INTRAMUSCULAR; INTRAVENOUS PRN
Status: DISCONTINUED | OUTPATIENT
Start: 2022-03-16 | End: 2022-03-16 | Stop reason: SDUPTHER

## 2022-03-16 RX ORDER — SODIUM CHLORIDE 9 MG/ML
INJECTION, SOLUTION INTRAVENOUS CONTINUOUS PRN
Status: DISCONTINUED | OUTPATIENT
Start: 2022-03-16 | End: 2022-03-16 | Stop reason: SDUPTHER

## 2022-03-16 RX ORDER — SODIUM CHLORIDE 0.9 % (FLUSH) 0.9 %
5-40 SYRINGE (ML) INJECTION EVERY 12 HOURS SCHEDULED
Status: DISCONTINUED | OUTPATIENT
Start: 2022-03-16 | End: 2022-03-16 | Stop reason: HOSPADM

## 2022-03-16 RX ORDER — ONDANSETRON 2 MG/ML
4 INJECTION INTRAMUSCULAR; INTRAVENOUS
Status: DISCONTINUED | OUTPATIENT
Start: 2022-03-16 | End: 2022-03-16 | Stop reason: HOSPADM

## 2022-03-16 RX ORDER — SODIUM CHLORIDE 9 MG/ML
25 INJECTION, SOLUTION INTRAVENOUS PRN
Status: DISCONTINUED | OUTPATIENT
Start: 2022-03-16 | End: 2022-03-16 | Stop reason: HOSPADM

## 2022-03-16 RX ORDER — CLINDAMYCIN PHOSPHATE 900 MG/50ML
900 INJECTION INTRAVENOUS
Status: COMPLETED | OUTPATIENT
Start: 2022-03-16 | End: 2022-03-16

## 2022-03-16 RX ORDER — MIDAZOLAM HYDROCHLORIDE 1 MG/ML
INJECTION INTRAMUSCULAR; INTRAVENOUS PRN
Status: DISCONTINUED | OUTPATIENT
Start: 2022-03-16 | End: 2022-03-16 | Stop reason: SDUPTHER

## 2022-03-16 RX ORDER — DEXAMETHASONE SODIUM PHOSPHATE 4 MG/ML
INJECTION, SOLUTION INTRA-ARTICULAR; INTRALESIONAL; INTRAMUSCULAR; INTRAVENOUS; SOFT TISSUE PRN
Status: DISCONTINUED | OUTPATIENT
Start: 2022-03-16 | End: 2022-03-16 | Stop reason: SDUPTHER

## 2022-03-16 RX ORDER — SODIUM CHLORIDE 9 MG/ML
INJECTION, SOLUTION INTRAVENOUS CONTINUOUS
Status: DISCONTINUED | OUTPATIENT
Start: 2022-03-16 | End: 2022-03-16 | Stop reason: HOSPADM

## 2022-03-16 RX ORDER — ROCURONIUM BROMIDE 10 MG/ML
INJECTION, SOLUTION INTRAVENOUS PRN
Status: DISCONTINUED | OUTPATIENT
Start: 2022-03-16 | End: 2022-03-16 | Stop reason: SDUPTHER

## 2022-03-16 RX ADMIN — LIDOCAINE HYDROCHLORIDE 100 MG: 20 INJECTION, SOLUTION EPIDURAL; INFILTRATION; INTRACAUDAL; PERINEURAL at 11:02

## 2022-03-16 RX ADMIN — CLINDAMYCIN PHOSPHATE 900 MG: 18 INJECTION, SOLUTION INTRAMUSCULAR; INTRAVENOUS at 10:54

## 2022-03-16 RX ADMIN — ONDANSETRON 4 MG: 2 INJECTION INTRAMUSCULAR; INTRAVENOUS at 14:35

## 2022-03-16 RX ADMIN — Medication 15 MG: at 12:52

## 2022-03-16 RX ADMIN — MIDAZOLAM 1 MG: 1 INJECTION INTRAMUSCULAR; INTRAVENOUS at 11:02

## 2022-03-16 RX ADMIN — ROCURONIUM BROMIDE 15 MG: 10 SOLUTION INTRAVENOUS at 13:45

## 2022-03-16 RX ADMIN — GLYCOPYRROLATE 0.2 MG: 0.2 INJECTION, SOLUTION INTRAMUSCULAR; INTRAVENOUS at 10:54

## 2022-03-16 RX ADMIN — FENTANYL CITRATE 25 MCG: 50 INJECTION INTRAMUSCULAR; INTRAVENOUS at 14:39

## 2022-03-16 RX ADMIN — METOPROLOL TARTRATE 2.5 MG: 5 INJECTION, SOLUTION INTRAVENOUS at 13:59

## 2022-03-16 RX ADMIN — MIDAZOLAM 1 MG: 1 INJECTION INTRAMUSCULAR; INTRAVENOUS at 10:54

## 2022-03-16 RX ADMIN — Medication 10 MG: at 11:25

## 2022-03-16 RX ADMIN — ROCURONIUM BROMIDE 50 MG: 10 SOLUTION INTRAVENOUS at 11:17

## 2022-03-16 RX ADMIN — HYDROMORPHONE HYDROCHLORIDE 0.5 MG: 1 INJECTION, SOLUTION INTRAMUSCULAR; INTRAVENOUS; SUBCUTANEOUS at 10:52

## 2022-03-16 RX ADMIN — SODIUM CHLORIDE: 9 INJECTION, SOLUTION INTRAVENOUS at 10:54

## 2022-03-16 RX ADMIN — FENTANYL CITRATE 50 MCG: 50 INJECTION INTRAMUSCULAR; INTRAVENOUS at 13:50

## 2022-03-16 RX ADMIN — FENTANYL CITRATE 50 MCG: 50 INJECTION INTRAMUSCULAR; INTRAVENOUS at 11:02

## 2022-03-16 RX ADMIN — KETOROLAC TROMETHAMINE 30 MG: 30 INJECTION, SOLUTION INTRAMUSCULAR; INTRAVENOUS at 14:35

## 2022-03-16 RX ADMIN — FENTANYL CITRATE 50 MCG: 50 INJECTION INTRAMUSCULAR; INTRAVENOUS at 10:54

## 2022-03-16 RX ADMIN — HYDROMORPHONE HYDROCHLORIDE 0.5 MG: 1 INJECTION, SOLUTION INTRAMUSCULAR; INTRAVENOUS; SUBCUTANEOUS at 12:20

## 2022-03-16 RX ADMIN — ROCURONIUM BROMIDE 10 MG: 10 SOLUTION INTRAVENOUS at 13:04

## 2022-03-16 RX ADMIN — Medication 160 MG: at 11:02

## 2022-03-16 RX ADMIN — Medication 10 MG: at 11:23

## 2022-03-16 RX ADMIN — Medication 15 MG: at 12:34

## 2022-03-16 RX ADMIN — SUGAMMADEX 200 MG: 100 INJECTION, SOLUTION INTRAVENOUS at 14:35

## 2022-03-16 RX ADMIN — ROCURONIUM BROMIDE 20 MG: 10 SOLUTION INTRAVENOUS at 12:01

## 2022-03-16 RX ADMIN — SODIUM CHLORIDE: 9 INJECTION, SOLUTION INTRAVENOUS at 12:32

## 2022-03-16 RX ADMIN — FENTANYL CITRATE 25 MCG: 50 INJECTION INTRAMUSCULAR; INTRAVENOUS at 14:45

## 2022-03-16 RX ADMIN — DEXAMETHASONE SODIUM PHOSPHATE 10 MG: 4 INJECTION, SOLUTION INTRAMUSCULAR; INTRAVENOUS at 11:17

## 2022-03-16 RX ADMIN — ROCURONIUM BROMIDE 20 MG: 10 SOLUTION INTRAVENOUS at 12:28

## 2022-03-16 RX ADMIN — PROPOFOL 200 MG: 10 INJECTION, EMULSION INTRAVENOUS at 11:02

## 2022-03-16 RX ADMIN — FENTANYL CITRATE 100 MCG: 50 INJECTION INTRAMUSCULAR; INTRAVENOUS at 11:38

## 2022-03-16 ASSESSMENT — PULMONARY FUNCTION TESTS
PIF_VALUE: 26
PIF_VALUE: 29
PIF_VALUE: 21
PIF_VALUE: 27
PIF_VALUE: 2
PIF_VALUE: 29
PIF_VALUE: 26
PIF_VALUE: 32
PIF_VALUE: 29
PIF_VALUE: 28
PIF_VALUE: 29
PIF_VALUE: 32
PIF_VALUE: 32
PIF_VALUE: 28
PIF_VALUE: 32
PIF_VALUE: 28
PIF_VALUE: 29
PIF_VALUE: 16
PIF_VALUE: 29
PIF_VALUE: 27
PIF_VALUE: 29
PIF_VALUE: 28
PIF_VALUE: 30
PIF_VALUE: 29
PIF_VALUE: 16
PIF_VALUE: 29
PIF_VALUE: 32
PIF_VALUE: 27
PIF_VALUE: 27
PIF_VALUE: 28
PIF_VALUE: 16
PIF_VALUE: 32
PIF_VALUE: 30
PIF_VALUE: 29
PIF_VALUE: 25
PIF_VALUE: 29
PIF_VALUE: 30
PIF_VALUE: 32
PIF_VALUE: 29
PIF_VALUE: 32
PIF_VALUE: 29
PIF_VALUE: 26
PIF_VALUE: 16
PIF_VALUE: 32
PIF_VALUE: 26
PIF_VALUE: 32
PIF_VALUE: 26
PIF_VALUE: 26
PIF_VALUE: 27
PIF_VALUE: 16
PIF_VALUE: 32
PIF_VALUE: 32
PIF_VALUE: 29
PIF_VALUE: 32
PIF_VALUE: 30
PIF_VALUE: 32
PIF_VALUE: 28
PIF_VALUE: 32
PIF_VALUE: 32
PIF_VALUE: 29
PIF_VALUE: 32
PIF_VALUE: 30
PIF_VALUE: 29
PIF_VALUE: 30
PIF_VALUE: 30
PIF_VALUE: 29
PIF_VALUE: 28
PIF_VALUE: 21
PIF_VALUE: 30
PIF_VALUE: 1
PIF_VALUE: 27
PIF_VALUE: 29
PIF_VALUE: 16
PIF_VALUE: 13
PIF_VALUE: 26
PIF_VALUE: 26
PIF_VALUE: 27
PIF_VALUE: 30
PIF_VALUE: 17
PIF_VALUE: 30
PIF_VALUE: 29
PIF_VALUE: 30
PIF_VALUE: 28
PIF_VALUE: 29
PIF_VALUE: 29
PIF_VALUE: 0
PIF_VALUE: 16
PIF_VALUE: 32
PIF_VALUE: 29
PIF_VALUE: 28
PIF_VALUE: 32
PIF_VALUE: 29
PIF_VALUE: 27
PIF_VALUE: 32
PIF_VALUE: 28
PIF_VALUE: 27
PIF_VALUE: 32
PIF_VALUE: 27
PIF_VALUE: 28
PIF_VALUE: 29
PIF_VALUE: 29
PIF_VALUE: 32
PIF_VALUE: 32
PIF_VALUE: 29
PIF_VALUE: 19
PIF_VALUE: 30
PIF_VALUE: 27
PIF_VALUE: 26
PIF_VALUE: 28
PIF_VALUE: 19
PIF_VALUE: 26
PIF_VALUE: 28
PIF_VALUE: 29
PIF_VALUE: 32
PIF_VALUE: 29
PIF_VALUE: 28
PIF_VALUE: 27
PIF_VALUE: 29
PIF_VALUE: 16
PIF_VALUE: 26
PIF_VALUE: 16
PIF_VALUE: 32
PIF_VALUE: 32
PIF_VALUE: 26
PIF_VALUE: 16
PIF_VALUE: 29
PIF_VALUE: 19
PIF_VALUE: 19
PIF_VALUE: 29
PIF_VALUE: 24
PIF_VALUE: 32
PIF_VALUE: 28
PIF_VALUE: 32
PIF_VALUE: 27
PIF_VALUE: 29
PIF_VALUE: 32
PIF_VALUE: 27
PIF_VALUE: 29
PIF_VALUE: 30
PIF_VALUE: 19
PIF_VALUE: 25
PIF_VALUE: 16
PIF_VALUE: 20
PIF_VALUE: 30
PIF_VALUE: 25
PIF_VALUE: 28
PIF_VALUE: 29
PIF_VALUE: 30
PIF_VALUE: 19
PIF_VALUE: 28
PIF_VALUE: 29
PIF_VALUE: 28
PIF_VALUE: 27
PIF_VALUE: 32
PIF_VALUE: 27
PIF_VALUE: 30
PIF_VALUE: 27
PIF_VALUE: 29
PIF_VALUE: 28
PIF_VALUE: 27
PIF_VALUE: 29
PIF_VALUE: 26
PIF_VALUE: 32
PIF_VALUE: 32
PIF_VALUE: 30
PIF_VALUE: 29
PIF_VALUE: 32
PIF_VALUE: 29
PIF_VALUE: 32
PIF_VALUE: 30
PIF_VALUE: 27
PIF_VALUE: 30
PIF_VALUE: 15
PIF_VALUE: 29
PIF_VALUE: 30
PIF_VALUE: 27
PIF_VALUE: 27
PIF_VALUE: 19
PIF_VALUE: 30
PIF_VALUE: 16
PIF_VALUE: 28
PIF_VALUE: 19
PIF_VALUE: 2
PIF_VALUE: 20
PIF_VALUE: 27
PIF_VALUE: 19
PIF_VALUE: 32
PIF_VALUE: 30
PIF_VALUE: 27
PIF_VALUE: 0
PIF_VALUE: 29
PIF_VALUE: 28
PIF_VALUE: 15
PIF_VALUE: 30
PIF_VALUE: 26
PIF_VALUE: 28
PIF_VALUE: 29
PIF_VALUE: 30
PIF_VALUE: 29
PIF_VALUE: 27
PIF_VALUE: 13
PIF_VALUE: 19
PIF_VALUE: 28
PIF_VALUE: 28
PIF_VALUE: 32
PIF_VALUE: 0
PIF_VALUE: 29
PIF_VALUE: 32
PIF_VALUE: 27
PIF_VALUE: 30
PIF_VALUE: 27
PIF_VALUE: 2
PIF_VALUE: 28
PIF_VALUE: 30
PIF_VALUE: 32
PIF_VALUE: 17
PIF_VALUE: 26

## 2022-03-16 ASSESSMENT — PAIN DESCRIPTION - DESCRIPTORS
DESCRIPTORS: SORE

## 2022-03-16 ASSESSMENT — PAIN SCALES - GENERAL
PAINLEVEL_OUTOF10: 1
PAINLEVEL_OUTOF10: 1
PAINLEVEL_OUTOF10: 0
PAINLEVEL_OUTOF10: 1
PAINLEVEL_OUTOF10: 3

## 2022-03-16 ASSESSMENT — PAIN DESCRIPTION - PAIN TYPE
TYPE: SURGICAL PAIN
TYPE: SURGICAL PAIN
TYPE: ACUTE PAIN
TYPE: SURGICAL PAIN

## 2022-03-16 ASSESSMENT — PAIN DESCRIPTION - FREQUENCY
FREQUENCY: INTERMITTENT
FREQUENCY: INTERMITTENT

## 2022-03-16 ASSESSMENT — PAIN DESCRIPTION - LOCATION
LOCATION: ABDOMEN;INCISION
LOCATION: ABDOMEN

## 2022-03-16 ASSESSMENT — PAIN DESCRIPTION - ONSET
ONSET: ON-GOING
ONSET: ON-GOING

## 2022-03-16 ASSESSMENT — PAIN - FUNCTIONAL ASSESSMENT
PAIN_FUNCTIONAL_ASSESSMENT: PREVENTS OR INTERFERES SOME ACTIVE ACTIVITIES AND ADLS
PAIN_FUNCTIONAL_ASSESSMENT: 0-10
PAIN_FUNCTIONAL_ASSESSMENT: PREVENTS OR INTERFERES SOME ACTIVE ACTIVITIES AND ADLS

## 2022-03-16 ASSESSMENT — PAIN DESCRIPTION - ORIENTATION
ORIENTATION: LEFT

## 2022-03-16 ASSESSMENT — PAIN DESCRIPTION - PROGRESSION
CLINICAL_PROGRESSION: NOT CHANGED
CLINICAL_PROGRESSION: NOT CHANGED

## 2022-03-16 NOTE — BRIEF OP NOTE
Brief Postoperative Note      Patient: Eran Mckenzie  YOB: 1975  MRN: [id number]    Date of Procedure: 3/16/2022    Pre-Op Diagnosis: INCISIONAL HERNIA, WITHOUT OBSTRUCTION OR GANGRENE    Post-Op Diagnosis: Same       Procedure(s):  ROBOTIC-ASSISTED LAPAROSCOPIC INCISIONAL HERNIA REPAIR WITH VENTRALIGHT MESH measuring 15 x 25 cm    Surgeon(s):  Celine Truong MD    Assistant:  Surgical Assistant: Donald Schultz    Anesthesia: General    Estimated Blood Loss (mL): less than 50     Complications: None    Specimens:   * No specimens in log *    Implants:  Implant Name Type Inv.  Item Serial No.  Lot No. LRB No. Used Action   MESH SURG [id number] UNCOATED MFIL POLYPR ABSRB HYDRGEL LO - [id number]  MESH SURG [id number] UNCOATED MFIL POLYPR ABSRB HYDRGEL LO  [facility] [id number] N/A 1 Implanted         Drains: * No LDAs found *    Findings: dense small bowel adhesions to lower midline; multiple defects along upper midline    Electronically signed by Celine Truong MD on 3/16/2022 at 2:33 PM

## 2022-03-16 NOTE — H&P
Update History & Physical    The patient's History and Physical from DOMENICO Davison from yesterday, March 16, 2022 was reviewed with the patient and I examined the patient. There was no change. The surgical site was confirmed by the patient and me. Incisional hernia marked. Plan: The risks, benefits, expected outcome, and alternative to the recommended procedure have been discussed with the patient. Patient understands and wants to proceed with the procedure. Will proceed with robotic assisted laparoscopic incisional hernia repair with mesh, possible open. Clindamycin ordered. Bilateral SCDs.     Electronically signed by Justin Chaudhary MD on 3/16/2022 at 10:44 AM

## 2022-03-16 NOTE — ANESTHESIA PRE PROCEDURE
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 Substance Use Topics    Alcohol use: Never     Alcohol/week: 0.0 standard drinks                                Counseling given: Not Answered  Comment: congratulated on nonsmoking status.       Vital Signs (Current):   Vitals:    03/16/22 0951 03/16/22 0957   BP:  (!) 145/99   Pulse:  77   Resp:  16   Temp:  96.2 °F (35.7 °C)   TempSrc:  Temporal   SpO2:  96%   Weight: (!) 305 lb 7.2 oz (138.6 kg)    Height: 6' 2\" (1.88 m)                                               BP Readings from Last 3 Encounters:   03/16/22 (!) 145/99   03/15/22 128/74   02/14/22 135/85       NPO Status: Time of last liquid consumption: 2359                        Time of last solid consumption: 2200                        Date of last liquid consumption: 03/15/22                        Date of last solid food consumption: 03/15/22    BMI:   Wt Readings from Last 3 Encounters:   03/16/22 (!) 305 lb 7.2 oz (138.6 kg)   03/15/22 (!) 309 lb 3.2 oz Findings:             Anesthesia Plan      general     ASA 2       Induction: intravenous. MIPS: Postoperative opioids intended and Prophylactic antiemetics administered. Anesthetic plan and risks discussed with patient. Plan discussed with CRNA.     Attending anesthesiologist reviewed and agrees with Preprocedure content              Shana Youssef MD   3/16/2022

## 2022-03-17 NOTE — OP NOTE
[address] [place] [address] [address]                                OPERATIVE REPORT    PATIENT NAME: Lilian Patterson                 :        1975  MED REC NO:   [id number]                          ROOM:  ACCOUNT NO:   [de-identified]                           ADMIT DATE: 2022  PROVIDER:     Lucho Holbrook MD      DATE OF PROCEDURE:  2022    PREOPERATIVE DIAGNOSIS:  Incisional hernia. POSTOPERATIVE DIAGNOSIS:  Incisional hernia. OPERATION PERFORMED:  Robotic-assisted laparoscopic incisional hernia  repair using Ventralight 15 x 25 cm mesh. SURGEON:  Lucho Holbrook MD    ANESTHESIA:  General endotracheal.    ASA CLASS:  II.    DVT PROPHYLAXIS:  The patient was wearing bilateral SCDs. ANTIBIOTICS:  The patient received clindamycin 900 mg IV preoperatively. INDICATIONS:  The patient is a 56-year-old male with a history of  perforated appendicitis and multiple exploratory laparotomies, who  presented with an enlarging incisional hernia of his epigastrium. CT  imaging revealed fat-containing incisional hernia along his midline. Due to these findings, the risks, benefits, and alternatives were  described to the patient. He was willing to consent for the procedure. OPERATIVE PROCEDURE:  After informed consent was obtained, the patient  was brought to the operating room and placed in the supine position. General anesthesia was induced. The patient was then prepped and draped  in the usual sterile fashion. Time-out was then performed. We first started with left upper quadrant incision at Montes's point. An 8-mm robotic Optiview was then used to gain access to the abdominal  activity. The abdomen was then insufflated with carbon dioxide gas. There was no evidence of injury with our initial trocar insertion.   We  then placed two additional 8-mm trocars along the left lateral sidewall  under direct visualization. Exparel was then injected bilaterally. We  then went ahead and docked the robot. The patient did have a moderate amount of midline adhesions. The  superior adhesions were omental and came down fairly easily. In doing  so, the patient had approximately five defects with the biggest one  being approximately 2.5 cm. Inferiorly, the patient had dense small  bowel adhesions to the anterior abdominal wall. These were very  carefully taken down and this was a very tedious process in order to  fully free up the small bowel loops. The bowel was inspected and did  not appear to be injured with the adhesiolysis. We then developed a preperitoneal space for mesh placement, starting in  the left side of the abdominal cavity. We then developed the  preperitoneal space across the midline and onto the right side. We then  reapproximated the biggest defect using permanent 0 V-Loc sutures. This  reapproximated the edges well. We then used a running #1 PDS Stratafix  in order to reapproximate the rectus muscles at the midline thereby  closing the 4 to 5 smaller defects. This was done from the epigastrium  down to the umbilicus. We then selected a 20 x 25 cm piece of Ventralight mesh. This was  trimmed to 15 x 25 cm and then introduced into the abdominal cavity. This was secured circumferentially using 2-0 absorbable Stratafix  sutures. This held the mesh into place. We then used 2-0 Monocryl  sutures in order to close the peritoneum overlying the mesh. The small  bowel again was then inspected and appeared viable without any evidence  of injury. We then allowed the abdomen to deflate. The incisions were closed with 4-0 Vicryl suture and Dermabond was  placed over top of the incisions. All counts were correct at the end of  the procedure. Overall, the patient tolerated the procedure well, was  taken to recovery room in stable condition.     FINDINGS:  Moderate amount of adhesions including small bowel adhesions  to the anterior abdominal wall with 5 to 6 incisional hernia defects. ESTIMATED BLOOD LOSS:  Less than 50 mL. COMPLICATIONS:  None. SPECIMEN:  None.         Mini Bueno MD    D: 03/16/2022 15:20:29       T: 03/16/2022 22:39:42     [email or web address]  Job#: [id number]     Doc#: [id number]    CC:

## 2022-03-21 RX ORDER — ATORVASTATIN CALCIUM 10 MG/1
10 TABLET, FILM COATED ORAL DAILY
Qty: 90 TABLET | Refills: 1 | Status: SHIPPED | OUTPATIENT
Start: 2022-03-21 | End: 2022-11-03

## 2022-06-14 ENCOUNTER — HOSPITAL ENCOUNTER (OUTPATIENT)
Dept: PET IMAGING | Age: 47
Discharge: HOME | End: 2022-06-14
Payer: COMMERCIAL

## 2022-06-14 DIAGNOSIS — C82.90 FOLLICULAR LYMPHOMA, UNSPECIFIED GRADE, UNSPECIFIED BODY REGION (HCC): ICD-10-CM

## 2022-06-14 PROCEDURE — 78815 PET IMAGE W/CT SKULL-THIGH: CPT

## 2022-06-14 PROCEDURE — 3430000000 HC RX DIAGNOSTIC RADIOPHARMACEUTICAL: Performed by: INTERNAL MEDICINE

## 2022-06-14 PROCEDURE — A9552 F18 FDG: HCPCS | Performed by: INTERNAL MEDICINE

## 2022-06-14 RX ORDER — FLUDEOXYGLUCOSE F 18 200 MCI/ML
13.16 INJECTION, SOLUTION INTRAVENOUS
Status: COMPLETED | OUTPATIENT
Start: 2022-06-14 | End: 2022-06-14

## 2022-06-14 RX ADMIN — FLUDEOXYGLUCOSE F 18 13.16 MILLICURIE: 200 INJECTION, SOLUTION INTRAVENOUS at 08:50

## 2022-07-17 DIAGNOSIS — L64.9 MALE PATTERN BALDNESS: ICD-10-CM

## 2022-07-18 RX ORDER — FINASTERIDE 1 MG/1
TABLET, FILM COATED ORAL
Qty: 90 TABLET | Refills: 1 | Status: SHIPPED | OUTPATIENT
Start: 2022-07-18

## 2022-08-08 ENCOUNTER — OFFICE VISIT (OUTPATIENT)
Dept: FAMILY MEDICINE CLINIC | Age: 47
End: 2022-08-08
Payer: COMMERCIAL

## 2022-08-08 VITALS
BODY MASS INDEX: 41.34 KG/M2 | OXYGEN SATURATION: 98 % | WEIGHT: 315 LBS | SYSTOLIC BLOOD PRESSURE: 136 MMHG | DIASTOLIC BLOOD PRESSURE: 86 MMHG | HEART RATE: 92 BPM | RESPIRATION RATE: 10 BRPM

## 2022-08-08 DIAGNOSIS — R20.2 PARESTHESIA OF RIGHT ARM: Primary | ICD-10-CM

## 2022-08-08 PROCEDURE — 99213 OFFICE O/P EST LOW 20 MIN: CPT | Performed by: NURSE PRACTITIONER

## 2022-08-08 NOTE — PROGRESS NOTES
8/8/2022    This is a 48 y.o. male   Chief Complaint   Patient presents with    Arm Pain     Rt arm \"tingling\" for 3 months   . HPI  Patient reports that he has been having right arm tingling for the past 3 months. Starts in a little above elbow area and radiates to thumb area. Denies neck pain or numbness. Denies decreased strength. Reports that tingling will come and go. Seems to be there with certain positions. Happens at night if sleeping on left side. Has not taken anything for symptoms. Currently undergoing chemotherapy for lymphoma with Dr. Brigid Bro. He reports that the tingling was present prior to starting chemo. He reports that he is tolerating the chemo except for feeling tired.       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) - s/p excision, watchful waiting,     Incisional hernia, without obstruction or gangrene          Current Outpatient Medications   Medication Sig Dispense Refill    finasteride (PROPECIA) 1 MG tablet TAKE 1 TABLET BY MOUTH DAILY 90 tablet 1    atorvastatin (LIPITOR) 10 MG tablet TAKE 1 TABLET BY MOUTH DAILY 90 tablet 1    docusate sodium (COLACE) 100 MG capsule Take 1 capsule by mouth 2 times daily as needed for Constipation (take while on narcotic pain medication) 60 capsule 0    ibuprofen (ADVIL;MOTRIN) 600 MG tablet Take 1 tablet by mouth 4 times daily as needed for Pain 120 tablet 0    FLUoxetine (PROZAC) 20 MG capsule Take 3 capsules by mouth daily 90 capsule 2    Armodafinil (NUVIGIL PO) Take 200 mg by mouth daily       buPROPion (WELLBUTRIN XL) 150 MG extended release tablet Take 150 mg by mouth every morning      eszopiclone (LUNESTA) 3 MG TABS Take 3 mg by mouth nightly. (Patient not taking: Reported on 8/8/2022)       No current facility-administered medications for this visit. Allergies   Allergen Reactions    Ampicillin     Cipro Xr        Review of Systems   Constitutional:  Positive for fatigue. Negative for activity change and fever. Musculoskeletal:  Negative for arthralgias, joint swelling and myalgias. Neurological:  Positive for numbness. Vitals:    08/08/22 1453 08/08/22 1512   BP: (!) 144/88 136/86   Site: Right Upper Arm Right Upper Arm   Position: Sitting Sitting   Cuff Size: Large Adult Large Adult   Pulse: 92    Resp: 10    SpO2: 98%    Weight: (!) 322 lb (146.1 kg)        Body mass index is 41.34 kg/m². Wt Readings from Last 3 Encounters:   08/08/22 (!) 322 lb (146.1 kg)   03/16/22 (!) 305 lb 7.2 oz (138.6 kg)   03/15/22 (!) 309 lb 3.2 oz (140.3 kg)       BP Readings from Last 3 Encounters:   08/08/22 (!) 144/88   03/16/22 134/74   03/16/22 (!) 134/96       Physical Exam  Vitals and nursing note reviewed. Constitutional:       Appearance: He is well-developed. He is obese. HENT:      Head: Normocephalic and atraumatic. Pulmonary:      Effort: Pulmonary effort is normal.   Musculoskeletal:      Right shoulder: No swelling, tenderness or bony tenderness. Normal range of motion. Normal strength. Right elbow: No swelling. Normal range of motion. No tenderness. Right wrist: No swelling, tenderness or bony tenderness. Normal range of motion. Cervical back: No swelling, tenderness or bony tenderness. No pain with movement. Normal range of motion. Skin:     General: Skin is warm and dry. Neurological:      Mental Status: He is alert and oriented to person, place, and time. Psychiatric:         Behavior: Behavior normal.         Thought Content: Thought content normal.         Judgment: Judgment normal.       Assessmentand Plan  Dorita Chirinos was seen today for arm pain.     Diagnoses and all orders for this visit:    Paresthesia of right arm  -     EMG; Future  Advised to wear wrist splints at night to help with symptoms. Complete EMG for further evaluation. F/u aft EMG is completed. Return if symptoms worsen or fail to improve.

## 2022-10-04 ENCOUNTER — HOSPITAL ENCOUNTER (OUTPATIENT)
Dept: PET IMAGING | Age: 47
Discharge: HOME | End: 2022-10-04
Payer: COMMERCIAL

## 2022-10-04 DIAGNOSIS — C82.90 FOLLICULAR LYMPHOMA, UNSPECIFIED FOLLICULAR LYMPHOMA TYPE, UNSPECIFIED BODY REGION (HCC): ICD-10-CM

## 2022-10-04 PROCEDURE — 3430000000 HC RX DIAGNOSTIC RADIOPHARMACEUTICAL: Performed by: INTERNAL MEDICINE

## 2022-10-04 PROCEDURE — 78815 PET IMAGE W/CT SKULL-THIGH: CPT

## 2022-10-04 PROCEDURE — A9552 F18 FDG: HCPCS | Performed by: INTERNAL MEDICINE

## 2022-10-04 RX ORDER — FLUDEOXYGLUCOSE F 18 200 MCI/ML
16.7 INJECTION, SOLUTION INTRAVENOUS
Status: COMPLETED | OUTPATIENT
Start: 2022-10-04 | End: 2022-10-04

## 2022-10-04 RX ADMIN — FLUDEOXYGLUCOSE F 18 16.7 MILLICURIE: 200 INJECTION, SOLUTION INTRAVENOUS at 10:00

## 2022-10-19 NOTE — PROGRESS NOTES
Pt with skin tag on R upper eyelid, bothersome to him. Reviewed risks of bleeding, damage to local structures, infection. Area was cleaned with alcohol. Skin tag removed with iris scissors without any complicated, pt tolerated procedure well. n/a

## 2022-11-03 RX ORDER — ATORVASTATIN CALCIUM 10 MG/1
10 TABLET, FILM COATED ORAL DAILY
Qty: 90 TABLET | Refills: 0 | Status: SHIPPED | OUTPATIENT
Start: 2022-11-03

## 2022-11-03 NOTE — TELEPHONE ENCOUNTER
Looked and pt scheduled appt yesterday. Lmom for pt to call back to r/s for in person visit due to appt note of wanting to be seen for joint pain.

## 2022-11-07 NOTE — TELEPHONE ENCOUNTER
Looked and pt scheduled appt yesterday.   Lmom for pt to call back to r/s for in person visit due to appt note of wanting to be seen for joint pain  Attempt #3

## 2022-11-07 NOTE — TELEPHONE ENCOUNTER
Looked and pt scheduled appt yesterday.   Lmom for pt to call back to r/s for in person visit due to appt note of wanting to be seen for joint pain  Attempt #2

## 2022-12-11 ENCOUNTER — PATIENT MESSAGE (OUTPATIENT)
Dept: FAMILY MEDICINE CLINIC | Age: 47
End: 2022-12-11

## 2022-12-12 NOTE — TELEPHONE ENCOUNTER
From: Sheila Barragan  To: Dr. Mary Martinez Day  Sent: 12/11/2022 1:58 PM EST  Subject: Covid Medicine    Im on vacation and just tested positive for covid. Can I get a prescription called in? Do I need to do a telemedicine appointment?     Thanks

## 2022-12-28 ENCOUNTER — PROCEDURE VISIT (OUTPATIENT)
Dept: NEUROLOGY | Age: 47
End: 2022-12-28
Payer: COMMERCIAL

## 2022-12-28 DIAGNOSIS — R20.2 PARESTHESIA OF RIGHT ARM: ICD-10-CM

## 2022-12-28 PROCEDURE — 95909 NRV CNDJ TST 5-6 STUDIES: CPT | Performed by: PSYCHIATRY & NEUROLOGY

## 2022-12-28 PROCEDURE — 95886 MUSC TEST DONE W/N TEST COMP: CPT | Performed by: PSYCHIATRY & NEUROLOGY

## 2022-12-28 NOTE — PROGRESS NOTES
Samantha Lopez M.D. [facility]) Physicians/[facility]  Board Certified in [address] [facility] [address] [facility], [address] [place] [address] [address] [facility]    EMG / NERVE CONDUCTION STUDY      PATIENT:  Mak Wolf       DATE OF EM22     YOB: 1975       REASON FOR EMG:   Right arm numbness      REFERRING PHYSICIAN:  Azam Zapata, APRN - CNP  [address] [address]     SUMMARY:   The right median motor nerve study was normal.  The right median sensory nerve study had a slightly prolonged distal latency. The right ulnar motor nerve study had a slowing of conduction velocity across the elbow. The right ulnar and radial sensory nerve studies were normal.  Needle EMG of several muscles in the right upper extremity was normal.      CLINICAL DIAGNOSIS:  Unspecified mononeuropathy        EMG RESULTS:     1. This patient has a mild to moderate right ulnar nerve lesion at the elbow. 2.  This patient also has a very mild right median nerve lesion at the wrist.  (Carpal tunnel syndrome). ---------------------------------------------  Samantha Lopez M.D.   Electromyographer / Neurologist

## 2022-12-30 DIAGNOSIS — G56.21 LESION OF RIGHT ULNAR NERVE: ICD-10-CM

## 2022-12-30 DIAGNOSIS — R20.2 PARESTHESIA OF RIGHT ARM: Primary | ICD-10-CM

## 2022-12-30 DIAGNOSIS — G56.01 CARPAL TUNNEL SYNDROME ON RIGHT: ICD-10-CM

## 2023-01-20 ENCOUNTER — OFFICE VISIT (OUTPATIENT)
Dept: ORTHOPEDIC SURGERY | Age: 48
End: 2023-01-20

## 2023-01-20 VITALS — RESPIRATION RATE: 16 BRPM | BODY MASS INDEX: 40.43 KG/M2 | WEIGHT: 315 LBS | HEIGHT: 74 IN

## 2023-01-20 DIAGNOSIS — G56.21 CUBITAL TUNNEL SYNDROME ON RIGHT: Primary | ICD-10-CM

## 2023-01-20 NOTE — PROGRESS NOTES
Mr. Srinivas Montoya is a 50 y.o. right handed man  who is seen today in Hand Surgical Consultation at the request of Payam Cloud MD.    He presents today regarding Right symptoms which have been present for approximately 9 months. A history of antecedent trauma or injury is Absent. He reports symptoms to include mild numbness & tingling in the hand. He states that in May he fell asleep on his hand and had numbness and tingling for the next couple of months, but it has improved and he has not noticed any in the last few months. Neurologic symptoms do not Frequently awaken him from sleep. He reports mild pain located in the Medial right elbow, without retrograde radiation . Symptoms are improving over time. Previous treatment has included conservative measures. He does not claim relation of his symptoms to his required work activities. He has undergone electrodiagnostic testing. I have today reviewed with Srinivas Montoya the clinically relevant, past medical history, medications, allergies,  family history, social history, and Review Of Systems & I have documented any details relevant to today's presenting complaints in my history above. Mr. Margaret Moody's self-reported past medical history, medications, allergies,  family history, social history, and Review Of Systems have been scanned into the chart under the \"Media\" tab. Physical Exam:  Mr. Anson Mora most recent vitals:  Vitals  Resp: 16  Height: 6' 2\" (188 cm)  Weight: (!) 322 lb (146.1 kg)    He is well nourished, oriented to person, place & time. He demonstrates appropriate mood and affect as well as normal gait and station.     Skin: Normal in appearance, Normal Color, and Free of Lesions Bilaterally   Digital range of motion is Full bilaterally  Wrist range of motion is Full bilaterally  Elbow range of motion is without significant limitation bilaterally  Sensation is subjectively normal in the Whole Hand and objectively present in the same distribution bilaterally. All other digits show normal sensation  Vascular examination reveals normal, good capillary refill, and good color bilaterally  Swelling is mild about the elbow on the Right, normal on the Left  There is no evidence of gross joint instability bilaterally. Muscular strength is clinically appropriate bilaterally. Examination for Cubital Tunnel Syndrome on the right shows mild tenderness to palpation at the Medial epicondyle. The Ulnar Nerve rests behind the medial epicondyle without subluxation upon elbow flexion. Elbow flexion-compression test is Negative, and there is not an active Tinnel's Sign over the Cubital Tunnel . The Ulnar Nerve innervated intrinsic musculature is not atrophied & weakened. Review of Electrodiagnostic Testing:  Electrodiagnostic Studies performed by another Physician outside of my practice were Personally Reviewed & Interpreted by myself today. Test performed on: 12/28/2022    NERVE CONDUCTION STUDY:  RIGHT   Median Nerve: Sensory Latency: 3.44  Motor Latency: 3.59  Ulnar Nerve:  Conduction Velocity:  41.7    EMG:  RIGHT  Normal      My Interpretation: This study is consistent with: Mild RIGHT Ulnar Nerve Entrapment at the Cubital Tunnel          Impression:  Mr. Karrie Jay has developed Ulnar Nerve entrapment at the Cubital Tunnel, which is currently exacerbated, and presents requesting further treatment. Plan:  I have had a thorough discussion with Mr. Karrie Jya regarding the treatment options available for his initially presenting right  cubital tunnel syndrome, which is causing him significant symptoms and difficulty. I have outlined for Mr. Karrie Jay the risk, benefits and consequences of the various treatment modalities, including a reasonable expectation for the long term success of each.   We have discussed the likelihood that further, more aggressive treatment may be required for his current presenting condition. Based upon our current discussion and a reasonable understating of the options available to him, Mr. Yumiko Orellana has selected to proceed with a conservative plan of treatment consisting of: attention to elbow positioning and pressure,  activity modification, and the judicious use of over-the-counter anti-inflammatory medications if allowed by his primary care physician. Instructions were given regarding the use of other modalities as appropriate. I have clearly explained to him that the above outlined treatment plan should not be expected to 'cure' his  cubital tunnel syndrome, but we are rather treating the symptoms with which he presents. He has understood that in order to achieve more durable relief of his symptoms and to prevent future worsening or further damage, that definitive surgical treatment would be required. Mr. Yumiko Orellana  voiced an appropriate understanding of our discussion, the options available to him, and of the expectations of his selected  treatment. I have also discussed with Mr. Yumiko Orellana  the other treatment options available to him  for this condition. We have today selected to proceed with conservative management. He and I have agreed that if our current course of conservative treatment does not prove to be effective over the short term future, that he will schedule a follow-up appointment to discuss and select an alternate course of therapy including possibly injection or surgical treatment. Mr. Yumiko Orellana has been given a full verbal list of instructions and precautions related to his present condition. I have asked him to followup with me in the office at the prescribed time. He is also specifically requested to call or return to the office sooner if his symptoms change or worsen prior to the next scheduled appointment.

## 2023-02-14 RX ORDER — ATORVASTATIN CALCIUM 10 MG/1
10 TABLET, FILM COATED ORAL DAILY
Qty: 90 TABLET | Refills: 0 | Status: SHIPPED | OUTPATIENT
Start: 2023-02-14

## 2023-05-10 ENCOUNTER — PATIENT MESSAGE (OUTPATIENT)
Dept: FAMILY MEDICINE CLINIC | Age: 48
End: 2023-05-10

## 2023-05-10 RX ORDER — NIRMATRELVIR AND RITONAVIR 150-100 MG
KIT ORAL
Qty: 20 TABLET | Refills: 0 | Status: SHIPPED | OUTPATIENT
Start: 2023-05-10 | End: 2023-05-15

## 2023-05-10 NOTE — TELEPHONE ENCOUNTER
From: Dominique Anand  To: Dr. Ej Calhoun Day  Sent: 5/10/2023 10:02 AM EDT  Subject: Paxlovid    I am on vacation and just tested positive for covid.  Could you please call in a prescription for Paxlovid at       Geo Simons [address] [place] [address]    [phone]    Thanks

## 2023-05-24 DIAGNOSIS — L64.9 MALE PATTERN BALDNESS: ICD-10-CM

## 2023-05-24 RX ORDER — FINASTERIDE 1 MG/1
TABLET, FILM COATED ORAL
Qty: 90 TABLET | Refills: 0 | Status: SHIPPED | OUTPATIENT
Start: 2023-05-24

## 2023-05-30 ENCOUNTER — HOSPITAL ENCOUNTER (OUTPATIENT)
Dept: PET IMAGING | Age: 48
Discharge: HOME | End: 2023-05-30
Payer: COMMERCIAL

## 2023-05-30 DIAGNOSIS — C82.90 FOLLICULAR LYMPHOMA, UNSPECIFIED FOLLICULAR LYMPHOMA TYPE, UNSPECIFIED BODY REGION (HCC): ICD-10-CM

## 2023-05-30 PROCEDURE — A9552 F18 FDG: HCPCS | Performed by: NURSE PRACTITIONER

## 2023-05-30 PROCEDURE — 3430000000 HC RX DIAGNOSTIC RADIOPHARMACEUTICAL: Performed by: NURSE PRACTITIONER

## 2023-05-30 PROCEDURE — 78815 PET IMAGE W/CT SKULL-THIGH: CPT

## 2023-05-30 RX ORDER — FLUDEOXYGLUCOSE F 18 200 MCI/ML
14.29 INJECTION, SOLUTION INTRAVENOUS
Status: COMPLETED | OUTPATIENT
Start: 2023-05-30 | End: 2023-05-30

## 2023-05-30 RX ADMIN — FLUDEOXYGLUCOSE F 18 14.29 MILLICURIE: 200 INJECTION, SOLUTION INTRAVENOUS at 11:11

## 2023-06-12 DIAGNOSIS — E78.2 MIXED HYPERLIPIDEMIA: ICD-10-CM

## 2023-06-12 LAB
ALBUMIN SERPL-MCNC: 4.3 G/DL (ref 3.4–5)
ALBUMIN/GLOB SERPL: 2 {RATIO} (ref 1.1–2.2)
ALP SERPL-CCNC: 80 U/L (ref 40–129)
ALT SERPL-CCNC: 15 U/L (ref 10–40)
ANION GAP SERPL CALCULATED.3IONS-SCNC: 9 MMOL/L (ref 3–16)
AST SERPL-CCNC: 15 U/L (ref 15–37)
BILIRUB SERPL-MCNC: 0.5 MG/DL (ref 0–1)
BUN SERPL-MCNC: 13 MG/DL (ref 7–20)
CALCIUM SERPL-MCNC: 8.7 MG/DL (ref 8.3–10.6)
CHLORIDE SERPL-SCNC: 103 MMOL/L (ref 99–110)
CHOLEST SERPL-MCNC: 206 MG/DL (ref 0–199)
CO2 SERPL-SCNC: 29 MMOL/L (ref 21–32)
CREAT SERPL-MCNC: 0.9 MG/DL (ref 0.9–1.3)
GFR SERPLBLD CREATININE-BSD FMLA CKD-EPI: >60 ML/MIN/{1.73_M2}
GLUCOSE SERPL-MCNC: 99 MG/DL (ref 70–99)
HDLC SERPL-MCNC: 38 MG/DL (ref 40–60)
LDLC SERPL CALC-MCNC: 142 MG/DL
POTASSIUM SERPL-SCNC: 4.6 MMOL/L (ref 3.5–5.1)
PROT SERPL-MCNC: 6.5 G/DL (ref 6.4–8.2)
SODIUM SERPL-SCNC: 141 MMOL/L (ref 136–145)
TRIGL SERPL-MCNC: 129 MG/DL (ref 0–150)
VLDLC SERPL CALC-MCNC: 26 MG/DL

## 2023-06-13 LAB
EST. AVERAGE GLUCOSE BLD GHB EST-MCNC: 111.2 MG/DL
HBA1C MFR BLD: 5.5 %

## 2023-08-26 DIAGNOSIS — L64.9 MALE PATTERN BALDNESS: ICD-10-CM

## 2023-08-28 RX ORDER — FINASTERIDE 1 MG/1
TABLET, FILM COATED ORAL
Qty: 90 TABLET | Refills: 0 | Status: SHIPPED | OUTPATIENT
Start: 2023-08-28

## 2023-09-11 ENCOUNTER — PATIENT MESSAGE (OUTPATIENT)
Dept: FAMILY MEDICINE CLINIC | Age: 48
End: 2023-09-11

## 2023-09-11 DIAGNOSIS — D22.9 ATYPICAL NEVUS: Primary | ICD-10-CM

## 2024-02-22 DIAGNOSIS — L64.9 MALE PATTERN BALDNESS: ICD-10-CM

## 2024-02-22 RX ORDER — FINASTERIDE 1 MG/1
TABLET, FILM COATED ORAL
Qty: 90 TABLET | Refills: 0 | Status: SHIPPED | OUTPATIENT
Start: 2024-02-22

## 2024-03-19 ENCOUNTER — HOSPITAL ENCOUNTER (OUTPATIENT)
Dept: PET IMAGING | Age: 49
Discharge: HOME | End: 2024-03-19
Payer: COMMERCIAL

## 2024-03-19 DIAGNOSIS — C82.90 FOLLICULAR LYMPHOMA, UNSPECIFIED FOLLICULAR LYMPHOMA TYPE, UNSPECIFIED BODY REGION (HCC): ICD-10-CM

## 2024-03-19 PROCEDURE — A9609 HC RX DIAGNOSTIC RADIOPHARMACEUTICAL: HCPCS | Performed by: INTERNAL MEDICINE

## 2024-03-19 PROCEDURE — 3430000000 HC RX DIAGNOSTIC RADIOPHARMACEUTICAL: Performed by: INTERNAL MEDICINE

## 2024-03-19 PROCEDURE — 78815 PET IMAGE W/CT SKULL-THIGH: CPT

## 2024-03-19 RX ORDER — FLUDEOXYGLUCOSE F 18 200 MCI/ML
12.91 INJECTION, SOLUTION INTRAVENOUS
Status: COMPLETED | OUTPATIENT
Start: 2024-03-19 | End: 2024-03-19

## 2024-03-19 RX ADMIN — FLUDEOXYGLUCOSE F 18 12.91 MILLICURIE: 200 INJECTION, SOLUTION INTRAVENOUS at 09:07

## 2024-04-16 DIAGNOSIS — E78.2 MIXED HYPERLIPIDEMIA: ICD-10-CM

## 2024-04-16 RX ORDER — ATORVASTATIN CALCIUM 10 MG/1
10 TABLET, FILM COATED ORAL DAILY
Qty: 90 TABLET | Refills: 1 | Status: SHIPPED | OUTPATIENT
Start: 2024-04-16

## 2024-05-21 ENCOUNTER — OFFICE VISIT (OUTPATIENT)
Dept: FAMILY MEDICINE CLINIC | Age: 49
End: 2024-05-21
Payer: COMMERCIAL

## 2024-05-21 VITALS
WEIGHT: 315 LBS | RESPIRATION RATE: 20 BRPM | HEIGHT: 74 IN | TEMPERATURE: 98.7 F | HEART RATE: 98 BPM | DIASTOLIC BLOOD PRESSURE: 86 MMHG | OXYGEN SATURATION: 95 % | BODY MASS INDEX: 40.43 KG/M2 | SYSTOLIC BLOOD PRESSURE: 132 MMHG

## 2024-05-21 DIAGNOSIS — J06.9 URI WITH COUGH AND CONGESTION: Primary | ICD-10-CM

## 2024-05-21 LAB
Lab: NORMAL
PERFORMING INSTRUMENT: NORMAL
QC PASS/FAIL: NORMAL
SARS-COV-2, POC: NORMAL

## 2024-05-21 PROCEDURE — G8417 CALC BMI ABV UP PARAM F/U: HCPCS | Performed by: NURSE PRACTITIONER

## 2024-05-21 PROCEDURE — 87426 SARSCOV CORONAVIRUS AG IA: CPT | Performed by: NURSE PRACTITIONER

## 2024-05-21 PROCEDURE — G8427 DOCREV CUR MEDS BY ELIG CLIN: HCPCS | Performed by: NURSE PRACTITIONER

## 2024-05-21 PROCEDURE — 3017F COLORECTAL CA SCREEN DOC REV: CPT | Performed by: NURSE PRACTITIONER

## 2024-05-21 PROCEDURE — 1036F TOBACCO NON-USER: CPT | Performed by: NURSE PRACTITIONER

## 2024-05-21 PROCEDURE — 99212 OFFICE O/P EST SF 10 MIN: CPT | Performed by: NURSE PRACTITIONER

## 2024-05-21 ASSESSMENT — ENCOUNTER SYMPTOMS
SORE THROAT: 1
RHINORRHEA: 1
COUGH: 1
ABDOMINAL PAIN: 0
WHEEZING: 0
VOMITING: 0
SHORTNESS OF BREATH: 0
NAUSEA: 0

## 2024-05-21 NOTE — PROGRESS NOTES
5/21/2024    This is a 48 y.o. male   Chief Complaint   Patient presents with    Congestion     X5 days. Congestion. Cough. Sore throat.  Fever. Chills. Body aches.  Fatigue. Slight headache. Wheezing. Sinus drainage.   .    URI   This is a new problem. The current episode started in the past 7 days. Associated symptoms include congestion, coughing, headaches, rhinorrhea and a sore throat. Pertinent negatives include no abdominal pain, chest pain, nausea, vomiting or wheezing. Treatments tried: tylenol and robitussin. The treatment provided mild relief.   Patient reports that on Friday 5/17 started with sore throat and sinus congestion, cough. Yesterday felt that he had a fever but did not check temp.   Denies shortness of breath, chest pain.  Has taken 3 home COVID-19 tests that have been negative.     He was on a plane ride on Friday. Was in [place] last week.        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       Allergies   Allergen Reactions    Ampicillin     Cipro Xr        Review of Systems   Constitutional:  Positive for activity change and fatigue.   HENT:  Positive for congestion, postnasal drip,

## 2024-11-06 ENCOUNTER — OFFICE VISIT (OUTPATIENT)
Dept: FAMILY MEDICINE CLINIC | Age: 49
End: 2024-11-06
Payer: COMMERCIAL

## 2024-11-06 VITALS
BODY MASS INDEX: 40.43 KG/M2 | OXYGEN SATURATION: 97 % | RESPIRATION RATE: 16 BRPM | HEIGHT: 74 IN | HEART RATE: 80 BPM | DIASTOLIC BLOOD PRESSURE: 100 MMHG | WEIGHT: 315 LBS | SYSTOLIC BLOOD PRESSURE: 160 MMHG

## 2024-11-06 DIAGNOSIS — Z12.11 COLON CANCER SCREENING: ICD-10-CM

## 2024-11-06 DIAGNOSIS — R53.83 FATIGUE, UNSPECIFIED TYPE: ICD-10-CM

## 2024-11-06 DIAGNOSIS — R06.09 DYSPNEA ON EXERTION: Primary | ICD-10-CM

## 2024-11-06 DIAGNOSIS — I10 ESSENTIAL HYPERTENSION: ICD-10-CM

## 2024-11-06 PROCEDURE — 3077F SYST BP >= 140 MM HG: CPT | Performed by: FAMILY MEDICINE

## 2024-11-06 PROCEDURE — 93000 ELECTROCARDIOGRAM COMPLETE: CPT | Performed by: FAMILY MEDICINE

## 2024-11-06 PROCEDURE — 1036F TOBACCO NON-USER: CPT | Performed by: FAMILY MEDICINE

## 2024-11-06 PROCEDURE — 99214 OFFICE O/P EST MOD 30 MIN: CPT | Performed by: FAMILY MEDICINE

## 2024-11-06 PROCEDURE — 3017F COLORECTAL CA SCREEN DOC REV: CPT | Performed by: FAMILY MEDICINE

## 2024-11-06 PROCEDURE — G8417 CALC BMI ABV UP PARAM F/U: HCPCS | Performed by: FAMILY MEDICINE

## 2024-11-06 PROCEDURE — 3080F DIAST BP >= 90 MM HG: CPT | Performed by: FAMILY MEDICINE

## 2024-11-06 PROCEDURE — G8484 FLU IMMUNIZE NO ADMIN: HCPCS | Performed by: FAMILY MEDICINE

## 2024-11-06 PROCEDURE — G8427 DOCREV CUR MEDS BY ELIG CLIN: HCPCS | Performed by: FAMILY MEDICINE

## 2024-11-06 RX ORDER — OLMESARTAN MEDOXOMIL 20 MG/1
20 TABLET ORAL DAILY
Qty: 30 TABLET | Refills: 1 | Status: SHIPPED | OUTPATIENT
Start: 2024-11-06

## 2024-11-06 SDOH — ECONOMIC STABILITY: FOOD INSECURITY: WITHIN THE PAST 12 MONTHS, THE FOOD YOU BOUGHT JUST DIDN'T LAST AND YOU DIDN'T HAVE MONEY TO GET MORE.: NEVER TRUE

## 2024-11-06 SDOH — ECONOMIC STABILITY: FOOD INSECURITY: WITHIN THE PAST 12 MONTHS, YOU WORRIED THAT YOUR FOOD WOULD RUN OUT BEFORE YOU GOT MONEY TO BUY MORE.: NEVER TRUE

## 2024-11-06 SDOH — ECONOMIC STABILITY: INCOME INSECURITY: HOW HARD IS IT FOR YOU TO PAY FOR THE VERY BASICS LIKE FOOD, HOUSING, MEDICAL CARE, AND HEATING?: NOT HARD AT ALL

## 2024-11-06 NOTE — PROGRESS NOTES
11/6/2024    This is a 48 y.o. male   Chief Complaint   Patient presents with    Shortness of Breath     Pt played soccer over the weekend with his nephews and the rest of the weekend he felt like crap     Fatigue     Pt has been having fatigue and is getting short of breathe easier this has been going on over the past month      HPI    Here for concerns for not feeling well    Going on for 1-2 months  - more stress at work  - feels more fatigued. Played soccer with his nephews over the weekend and felt out of breath  - getting tired more easily. For example, climbing stadium stairs for work he gets winded more easily than before  - has noticed weight gain. Stopped intermittent fasting. Hasn't noticed any swelling or sensation of water weight    Denies chest pain    Reports getting good, adequate sleep. Uses CPAP    Follicular non-Hodgkin's lymphoma  - stopped Rituxan recently  - following with Dr. Plaza    Elevated blood pressure  - noted today  - family hx of HTN  - has had an elevated reading at [facility] (142/96)    Review of Systems     Current Outpatient Medications   Medication Sig Dispense Refill    olmesartan (BENICAR) 20 MG tablet Take 1 tablet by mouth daily 30 tablet 1    atorvastatin (LIPITOR) 10 MG tablet TAKE 1 TABLET BY MOUTH DAILY 90 tablet 1    FLUoxetine (PROZAC) 20 MG capsule Take 3 capsules by mouth daily 90 capsule 2    Armodafinil (NUVIGIL PO) Take 200 mg by mouth daily       finasteride (PROPECIA) 1 MG tablet TAKE 1 TABLET BY MOUTH DAILY (Patient not taking: Reported on 11/6/2024) 90 tablet 0     No current facility-administered medications for this visit.     BP (!) 160/100   Pulse 80   Resp 16   Ht 1.88 m (6' 2\")   Wt (!) 157.4 kg (347 lb)   SpO2 97%   BMI 44.55 kg/m²     Physical Exam  Cardiovascular:      Rate and Rhythm: Normal rate and regular rhythm.   Pulmonary:      Effort: Pulmonary effort is normal.      Breath sounds: Normal breath sounds.         Wt Readings from Last 3

## 2024-11-07 RX ORDER — OLMESARTAN MEDOXOMIL 20 MG/1
20 TABLET ORAL DAILY
Qty: 90 TABLET | OUTPATIENT
Start: 2024-11-07

## 2024-12-07 DIAGNOSIS — E78.2 MIXED HYPERLIPIDEMIA: ICD-10-CM

## 2024-12-09 RX ORDER — ATORVASTATIN CALCIUM 10 MG/1
10 TABLET, FILM COATED ORAL DAILY
Qty: 90 TABLET | Refills: 1 | Status: SHIPPED | OUTPATIENT
Start: 2024-12-09

## 2025-01-02 DIAGNOSIS — I10 ESSENTIAL HYPERTENSION: ICD-10-CM

## 2025-01-02 RX ORDER — OLMESARTAN MEDOXOMIL 20 MG/1
20 TABLET ORAL DAILY
Qty: 90 TABLET | Refills: 0 | Status: SHIPPED | OUTPATIENT
Start: 2025-01-02

## 2025-01-06 ENCOUNTER — ANESTHESIA EVENT (OUTPATIENT)
Dept: ENDOSCOPY | Age: 50
End: 2025-01-06
Payer: COMMERCIAL

## 2025-01-06 ENCOUNTER — HOSPITAL ENCOUNTER (OUTPATIENT)
Age: 50
Setting detail: OUTPATIENT SURGERY
Discharge: HOME | End: 2025-01-07
Attending: INTERNAL MEDICINE | Admitting: INTERNAL MEDICINE
Payer: COMMERCIAL

## 2025-01-06 DIAGNOSIS — Z12.11 SCREEN FOR COLON CANCER: ICD-10-CM

## 2025-01-07 ENCOUNTER — HOSPITAL ENCOUNTER (OUTPATIENT)
Dept: ENDOSCOPY | Age: 50
Setting detail: OUTPATIENT SURGERY
Discharge: HOME | End: 2025-01-07
Attending: INTERNAL MEDICINE
Payer: COMMERCIAL

## 2025-01-07 ENCOUNTER — ANESTHESIA (OUTPATIENT)
Dept: ENDOSCOPY | Age: 50
End: 2025-01-07
Payer: COMMERCIAL

## 2025-01-07 VITALS
TEMPERATURE: 97.2 F | HEART RATE: 70 BPM | DIASTOLIC BLOOD PRESSURE: 71 MMHG | RESPIRATION RATE: 18 BRPM | OXYGEN SATURATION: 95 % | SYSTOLIC BLOOD PRESSURE: 117 MMHG | HEIGHT: 73 IN | BODY MASS INDEX: 41.75 KG/M2 | WEIGHT: 315 LBS

## 2025-01-07 PROBLEM — Z98.890 HISTORY OF COLONOSCOPY WITH POLYPECTOMY: Status: ACTIVE | Noted: 2025-01-07

## 2025-01-07 PROBLEM — Z86.0100 HISTORY OF COLONOSCOPY WITH POLYPECTOMY: Status: ACTIVE | Noted: 2025-01-07

## 2025-01-07 PROCEDURE — 3609010600 HC COLONOSCOPY POLYPECTOMY SNARE/COLD BIOPSY: Performed by: INTERNAL MEDICINE

## 2025-01-07 PROCEDURE — 3700000000 HC ANESTHESIA ATTENDED CARE: Performed by: INTERNAL MEDICINE

## 2025-01-07 PROCEDURE — 6360000002 HC RX W HCPCS: Performed by: NURSE ANESTHETIST, CERTIFIED REGISTERED

## 2025-01-07 PROCEDURE — 7100000011 HC PHASE II RECOVERY - ADDTL 15 MIN: Performed by: INTERNAL MEDICINE

## 2025-01-07 PROCEDURE — 3700000001 HC ADD 15 MINUTES (ANESTHESIA): Performed by: INTERNAL MEDICINE

## 2025-01-07 PROCEDURE — 7100000010 HC PHASE II RECOVERY - FIRST 15 MIN: Performed by: INTERNAL MEDICINE

## 2025-01-07 PROCEDURE — 2709999900 HC NON-CHARGEABLE SUPPLY: Performed by: INTERNAL MEDICINE

## 2025-01-07 PROCEDURE — 7100000001 HC PACU RECOVERY - ADDTL 15 MIN: Performed by: INTERNAL MEDICINE

## 2025-01-07 PROCEDURE — 7100000000 HC PACU RECOVERY - FIRST 15 MIN: Performed by: INTERNAL MEDICINE

## 2025-01-07 PROCEDURE — 88305 TISSUE EXAM BY PATHOLOGIST: CPT

## 2025-01-07 RX ORDER — NALOXONE HYDROCHLORIDE 0.4 MG/ML
INJECTION, SOLUTION INTRAMUSCULAR; INTRAVENOUS; SUBCUTANEOUS PRN
Status: DISCONTINUED | OUTPATIENT
Start: 2025-01-07 | End: 2025-01-07 | Stop reason: HOSPADM

## 2025-01-07 RX ORDER — PROPOFOL 10 MG/ML
INJECTION, EMULSION INTRAVENOUS
Status: DISCONTINUED | OUTPATIENT
Start: 2025-01-07 | End: 2025-01-07 | Stop reason: SDUPTHER

## 2025-01-07 RX ORDER — DROPERIDOL 2.5 MG/ML
0.62 INJECTION, SOLUTION INTRAMUSCULAR; INTRAVENOUS
Status: DISCONTINUED | OUTPATIENT
Start: 2025-01-07 | End: 2025-01-07 | Stop reason: HOSPADM

## 2025-01-07 RX ORDER — LIDOCAINE HYDROCHLORIDE 20 MG/ML
INJECTION, SOLUTION EPIDURAL; INFILTRATION; INTRACAUDAL; PERINEURAL
Status: DISCONTINUED | OUTPATIENT
Start: 2025-01-07 | End: 2025-01-07 | Stop reason: SDUPTHER

## 2025-01-07 RX ORDER — ONDANSETRON 2 MG/ML
4 INJECTION INTRAMUSCULAR; INTRAVENOUS
Status: DISCONTINUED | OUTPATIENT
Start: 2025-01-07 | End: 2025-01-07 | Stop reason: HOSPADM

## 2025-01-07 RX ORDER — SODIUM CHLORIDE 0.9 % (FLUSH) 0.9 %
5-40 SYRINGE (ML) INJECTION EVERY 12 HOURS SCHEDULED
Status: DISCONTINUED | OUTPATIENT
Start: 2025-01-07 | End: 2025-01-07 | Stop reason: HOSPADM

## 2025-01-07 RX ORDER — SODIUM CHLORIDE 0.9 % (FLUSH) 0.9 %
5-40 SYRINGE (ML) INJECTION PRN
Status: DISCONTINUED | OUTPATIENT
Start: 2025-01-07 | End: 2025-01-07 | Stop reason: HOSPADM

## 2025-01-07 RX ORDER — SODIUM CHLORIDE 9 MG/ML
INJECTION, SOLUTION INTRAVENOUS PRN
Status: DISCONTINUED | OUTPATIENT
Start: 2025-01-07 | End: 2025-01-07 | Stop reason: HOSPADM

## 2025-01-07 RX ADMIN — LIDOCAINE HYDROCHLORIDE 100 MG: 20 INJECTION, SOLUTION EPIDURAL; INFILTRATION; INTRACAUDAL; PERINEURAL at 08:20

## 2025-01-07 RX ADMIN — PROPOFOL 100 MG: 10 INJECTION, EMULSION INTRAVENOUS at 08:20

## 2025-01-07 RX ADMIN — PROPOFOL 250 MCG/KG/MIN: 10 INJECTION, EMULSION INTRAVENOUS at 08:21

## 2025-01-07 ASSESSMENT — PAIN - FUNCTIONAL ASSESSMENT
PAIN_FUNCTIONAL_ASSESSMENT: 0-10
PAIN_FUNCTIONAL_ASSESSMENT: NONE - DENIES PAIN

## 2025-01-07 ASSESSMENT — PAIN SCALES - GENERAL: PAINLEVEL_OUTOF10: 0

## 2025-01-07 NOTE — PROGRESS NOTES
Patient opens eyes to name, oral air way removed. Patient drowsy. Patient denies C/O pain or nausea. Resp easy unlabored on room air O2 with SaO2 94%. Abdomen soft. Moving all extremities to command. VSS.

## 2025-01-07 NOTE — PROGRESS NOTES
Patient more awake and alert. Resp easy unlabored on room air O2 with SaO2 94%. Abdomen rounded soft. Patient denies C/O pain or nausea. Patient stable to transfer to ACU for phase II.

## 2025-01-07 NOTE — OP NOTE
Colonoscopy Procedure Note      Patient: Robert Moody  : 1975  Acct#:     Procedure: Colonoscopy with polypectomy (cold snare)    Date:  2025    Surgeon:  Yoan Madrigal MD    Referring Physician:  Werner Ervin MD    Preoperative Diagnosis:  49 y.o. male  who presents for colonoscopy due to colon cancer screening.     Postoperative Diagnosis:    -5 mm rectosigmoid colon polyp  -Redundant colon with fair preparation    Consent:  The patient or their legal guardian has signed a consent, and is aware of the potential risks, benefits, alternatives, and potential complications of this procedure.  These include, but are not limited to hemorrhage, bleeding, post procedural pain, perforation, phlebitis, aspiration, hypotension, hypoxia, cardiovascular events such as arryhthmia, and possibly death.  Additionally, the possibility of missed colonic polyps and interval colon cancer was discussed in the consent.    Anesthesia:  MAc    Procedure:   An informed consent was obtained from the patient after explanation of indications, benefits, possible risks and complications of the procedure.  The patient was then taken to the endoscopy suite, placed in the left lateral decubitus position, and the above IV anesthesia was administered.    A digital rectal examination was performed and revealed negative without mass, lesions or tenderness.      The Olympus adult video colonoscope was placed in the patient's rectum under digital direction and advanced to the cecum. The cecum was identified by characteristic anatomy and ballottment. The ileocecal valve was identified.  Due to colonoscope looping and quality bowel preparation, cecal evaluation was difficult and limited.  EBL: The scope was removed and a pediatric colonoscope was placed in the patient's rectum under digital direction advanced again to the level of the ileocecal valve.  The cecal vault was visualized, but the scope was unable to intubate the  Consult received. Patient with a complicated hospital course and currently Palliative care is consulted for goals of care discussion. Due to patient's age and current COVID+ status will ensure that Corpak is absolutely necessary and indicated for the patient. Awaiting call from primary team. If deemed clinically indicated then general surgery will be happy to assist with placement. Discussed with Dr. Jeanna Chapa.     Diego Byrd,   PGY-5 General Surgery  01/03/22  4:27 PM  [phone]

## 2025-01-07 NOTE — ANESTHESIA PRE PROCEDURE
Department of Anesthesiology  Preprocedure Note       Name:  Robert Moody   Age:  49 y.o.  :  1975                                          MRN:  [id number]         Date:  2025      Surgeon: Surgeon(s):  Yoan Madrigal MD    Procedure: Procedure(s):  COLONOSCOPY    Medications prior to admission:   Prior to Admission medications    Medication Sig Start Date End Date Taking? Authorizing Provider   olmesartan (BENICAR) 20 MG tablet TAKE 1 TABLET BY MOUTH DAILY 25  Yes Day, Werner CASTELLANO MD   atorvastatin (LIPITOR) 10 MG tablet TAKE 1 TABLET BY MOUTH DAILY 24  Yes Day, Werner CASTELLANO MD   FLUoxetine (PROZAC) 20 MG capsule Take 3 capsules by mouth daily  Patient taking differently: Take 2 capsules by mouth daily 2 CAPSULES DAILY 22  Yes Day, Werner CASTELLANO MD   Armodafinil (NUVIGIL PO) Take 200 mg by mouth daily    Yes Provider, MD Roger       Current medications:    Current Facility-Administered Medications   Medication Dose Route Frequency Provider Last Rate Last Admin    sodium chloride flush 0.9 % injection 5-40 mL  5-40 mL IntraVENous 2 times per day Justin uSe MD        sodium chloride flush 0.9 % injection 5-40 mL  5-40 mL IntraVENous PRN Justin Sue MD        0.9 % sodium chloride infusion   IntraVENous PRN Justin Sue MD           Allergies:    Allergies   Allergen Reactions    Cipro Xr Swelling    Ampicillin Rash       Problem List:    Patient Active Problem List   Diagnosis Code    Tremor R25.1    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

## 2025-01-07 NOTE — PROGRESS NOTES
Patient admitted to PACU from [facility]. Patient asleep. Oral airway intact. Resp easy unlabored on 3L NC with SaO2 92%. IV capped to right hand. Abdomen rounded soft. VSS.

## 2025-01-07 NOTE — H&P
Pre-operative History and Physical    Patient: Robert Moody  : 1975  Acct#:     Intended Procedure:  colonoscopy    HISTORY OF PRESENT ILLNESS:  The patient is a 49 y.o. male  who presents for colonoscopy due to colon cancer screening.    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) - s/p excision, watchful waiting,  2020    Rash     Sleep apnea     USES C-Pap    Tinnitus     Wears contact lenses      Past Surgical History:        Procedure Laterality Date    ABDOMEN SURGERY      exploratory    APPENDECTOMY      COLONOSCOPY      HERNIA REPAIR N/A 2022    ROBOTIC-ASSISTED LAPAROSCOPIC INCISIONAL HERNIA REPAIR WITH MESH performed by Francesco Mead MD at [facility] OR    NECK SURGERY N/A 2020    EXCISION RIGHT CERVICAL LYMPH NODE performed by Kendrick Mann MD at [facility] OR     Medications Prior to Admission:   No current facility-administered medications on file prior to encounter.     Current Outpatient Medications on File Prior to Encounter   Medication Sig Dispense Refill    FLUoxetine (PROZAC) 20 MG capsule Take 3 capsules by mouth daily (Patient taking differently: Take 2 capsules by mouth daily 2 CAPSULES DAILY) 90 capsule 2    Armodafinil (NUVIGIL PO) Take 200 mg by mouth daily           Allergies:  Cipro xr and Ampicillin    Social History:   TOBACCO:   reports that he has never smoked. He has never used smokeless tobacco.  ETOH:   reports no history of alcohol use.  DRUGS:   reports no history of drug use.    PHYSICAL EXAM:      Vital Signs: BP (!) 164/80   Pulse 79   Temp 98.6 °F (37 °C) (Oral)   Resp 17   Ht 1.854 m (6' 1\")   Wt (!) 158.9 kg (350 lb 5 oz)   SpO2 95%   BMI 46.22 kg/m²    Airway: No stridor or wheezing noted.  Good air movement  Pulmonary: without wheezes.  Clear to auscultation  Cardiac:regular

## 2025-01-07 NOTE — ANESTHESIA POSTPROCEDURE EVALUATION
Department of Anesthesiology  Postprocedure Note    Patient: Robert Moody  MRN: [id number]  YOB: 1975  Date of evaluation: 1/7/2025    Procedure Summary       Date: 01/07/25 Room / Location: [place] [address] / [facility]    Anesthesia Start: 0816 Anesthesia Stop: 0907    Procedure: COLONOSCOPY POLYPECTOMY SNARE/BIOPSY Diagnosis:       Screen for colon cancer      (Screen for colon cancer [Z12.11])    Surgeons: Yoan Madrigal MD Responsible Provider: Som Penaloza MD    Anesthesia Type: MAC ASA Status: 3            Anesthesia Type: No value filed.    Clari Phase I: Clari Score: 9    Clari Phase II:      Anesthesia Post Evaluation    Patient location during evaluation: PACU  Patient participation: complete - patient participated  Level of consciousness: awake  Airway patency: patent  Nausea & Vomiting: no nausea and no vomiting  Cardiovascular status: blood pressure returned to baseline  Respiratory status: acceptable  Hydration status: stable  Comments: Vital signs stable  OK to discharge from Stage I post anesthesia care.  Care transferred from Anesthesiology department on discharge from perioperative area   Multimodal analgesia pain management approach  Pain management: satisfactory to patient    No notable events documented.

## 2025-01-07 NOTE — PROGRESS NOTES
Pt states ready to go home. Pt discharged in stable condition. Pt offers no c/o including pain or nausea. Abdomen soft. Pt had soda and crackers.

## 2025-01-07 NOTE — DISCHARGE INSTRUCTIONS
Discharge Instructions for Colonoscopy     Recommendations:    - Follow-up pathology results in 7-10 days by checking the patient portal via [employer]  - Resume regular medications.  - Resume diet as tolerated.  - Repeat colonoscopy in 3 years.     Colonoscopy is a visual exam of the lining of the large intestine, also called the bowel or colon, with a colonoscope. A colonoscope is a flexible tube with a light and a viewing device. It allows the doctor to view the inside of the colon through a tiny video camera.     Colonoscopy is performed for many reasons: unexplained anemia , pain, diarrhea , bloody stools, cancer screening, among many other reasons.     Complications from a colonoscopy are rare. Some possible serious complications include perforated bowel (which might require surgery) and bleeding (which could require blood transfusion ). Minor complications include bloating, gas, and cramping that can last for 1-2 days after the procedure.     Because air is put into your colon during the procedure, it is normal to pass large amounts of air from your rectum. You may not have a bowel movement for 1-3 days after the procedure.     What You Will Need:  Someone to drive you home after the procedure     Steps to Take:  Home Care -  Rest when you get home.   Because the sedative will make you drowsy, don't drive, operate machinery, or make important decisions the day of the procedure.  Feelings of bloating, gas, or cramping may persist for 24 hours.   Diet -  Try sips of water first. If tolerated, resume bland food (scrambled eggs, toast, soup) first.  If tolerated, resume regular diet or the diet recommended by your physician.   Do not drink alcohol for 24 hours.   Physical Activity -  Ask your doctor when you will be able to return to work.   Do not drive, operate heavy machinery, or do activities that require coordination or balance for 24 hours.   Otherwise, return to your normal routine as soon as you

## 2025-03-25 ENCOUNTER — OFFICE VISIT (OUTPATIENT)
Dept: FAMILY MEDICINE CLINIC | Age: 50
End: 2025-03-25
Payer: COMMERCIAL

## 2025-03-25 VITALS
BODY MASS INDEX: 41.75 KG/M2 | HEART RATE: 81 BPM | DIASTOLIC BLOOD PRESSURE: 76 MMHG | WEIGHT: 315 LBS | HEIGHT: 73 IN | SYSTOLIC BLOOD PRESSURE: 124 MMHG | OXYGEN SATURATION: 95 % | TEMPERATURE: 98.7 F | RESPIRATION RATE: 20 BRPM

## 2025-03-25 DIAGNOSIS — E66.01 MORBID OBESITY WITH BMI OF 45.0-49.9, ADULT: Primary | ICD-10-CM

## 2025-03-25 DIAGNOSIS — I10 ESSENTIAL HYPERTENSION: ICD-10-CM

## 2025-03-25 DIAGNOSIS — R53.83 FATIGUE, UNSPECIFIED TYPE: ICD-10-CM

## 2025-03-25 DIAGNOSIS — E66.01 MORBID OBESITY WITH BMI OF 45.0-49.9, ADULT: ICD-10-CM

## 2025-03-25 LAB
ALBUMIN SERPL-MCNC: 4.4 G/DL (ref 3.4–5)
ALBUMIN/GLOB SERPL: 2 {RATIO} (ref 1.1–2.2)
ALP SERPL-CCNC: 82 U/L (ref 40–129)
ALT SERPL-CCNC: 39 U/L (ref 10–40)
ANION GAP SERPL CALCULATED.3IONS-SCNC: 11 MMOL/L (ref 3–16)
ANION GAP SERPL CALCULATED.3IONS-SCNC: 8 MMOL/L (ref 3–16)
AST SERPL-CCNC: 28 U/L (ref 15–37)
BILIRUB SERPL-MCNC: 0.5 MG/DL (ref 0–1)
BUN SERPL-MCNC: 12 MG/DL (ref 7–20)
BUN SERPL-MCNC: 12 MG/DL (ref 7–20)
CALCIUM SERPL-MCNC: 9 MG/DL (ref 8.3–10.6)
CALCIUM SERPL-MCNC: 9.1 MG/DL (ref 8.3–10.6)
CHLORIDE SERPL-SCNC: 102 MMOL/L (ref 99–110)
CHLORIDE SERPL-SCNC: 102 MMOL/L (ref 99–110)
CO2 SERPL-SCNC: 27 MMOL/L (ref 21–32)
CO2 SERPL-SCNC: 28 MMOL/L (ref 21–32)
CREAT SERPL-MCNC: 1.1 MG/DL (ref 0.9–1.3)
CREAT SERPL-MCNC: 1.1 MG/DL (ref 0.9–1.3)
EST. AVERAGE GLUCOSE BLD GHB EST-MCNC: 114 MG/DL
FERRITIN SERPL IA-MCNC: 157 NG/ML (ref 30–400)
GFR SERPLBLD CREATININE-BSD FMLA CKD-EPI: 81 ML/MIN/{1.73_M2}
GFR SERPLBLD CREATININE-BSD FMLA CKD-EPI: 81 ML/MIN/{1.73_M2}
GLUCOSE SERPL-MCNC: 109 MG/DL (ref 70–99)
GLUCOSE SERPL-MCNC: 113 MG/DL (ref 70–99)
HBA1C MFR BLD: 5.6 %
POTASSIUM SERPL-SCNC: 4.5 MMOL/L (ref 3.5–5.1)
POTASSIUM SERPL-SCNC: 4.5 MMOL/L (ref 3.5–5.1)
PROT SERPL-MCNC: 6.6 G/DL (ref 6.4–8.2)
SODIUM SERPL-SCNC: 138 MMOL/L (ref 136–145)
SODIUM SERPL-SCNC: 140 MMOL/L (ref 136–145)
TSH SERPL DL<=0.005 MIU/L-ACNC: 1.94 UIU/ML (ref 0.27–4.2)

## 2025-03-25 PROCEDURE — 3074F SYST BP LT 130 MM HG: CPT | Performed by: NURSE PRACTITIONER

## 2025-03-25 PROCEDURE — 99213 OFFICE O/P EST LOW 20 MIN: CPT | Performed by: NURSE PRACTITIONER

## 2025-03-25 PROCEDURE — 1036F TOBACCO NON-USER: CPT | Performed by: NURSE PRACTITIONER

## 2025-03-25 PROCEDURE — G8417 CALC BMI ABV UP PARAM F/U: HCPCS | Performed by: NURSE PRACTITIONER

## 2025-03-25 PROCEDURE — G8427 DOCREV CUR MEDS BY ELIG CLIN: HCPCS | Performed by: NURSE PRACTITIONER

## 2025-03-25 PROCEDURE — 3078F DIAST BP <80 MM HG: CPT | Performed by: NURSE PRACTITIONER

## 2025-03-25 PROCEDURE — 3017F COLORECTAL CA SCREEN DOC REV: CPT | Performed by: NURSE PRACTITIONER

## 2025-03-25 SDOH — ECONOMIC STABILITY: FOOD INSECURITY: WITHIN THE PAST 12 MONTHS, YOU WORRIED THAT YOUR FOOD WOULD RUN OUT BEFORE YOU GOT MONEY TO BUY MORE.: NEVER TRUE

## 2025-03-25 SDOH — ECONOMIC STABILITY: FOOD INSECURITY: WITHIN THE PAST 12 MONTHS, THE FOOD YOU BOUGHT JUST DIDN'T LAST AND YOU DIDN'T HAVE MONEY TO GET MORE.: NEVER TRUE

## 2025-03-25 ASSESSMENT — ENCOUNTER SYMPTOMS
DIARRHEA: 0
VOMITING: 0
NAUSEA: 0
COUGH: 0
SHORTNESS OF BREATH: 0

## 2025-03-25 NOTE — PATIENT INSTRUCTIONS
Mediterranean diet: Choose this heart-healthy diet option  The Mediterranean diet is a heart-healthy eating plan combining elements of Mediterranean-style cooking. Here's how to adopt the Mediterranean diet.   If you're looking for a heart-healthy eating plan, the Mediterranean diet might be right for you. The Mediterranean diet incorporates the basics of healthy eating -- plus a splash of flavorful olive oil and perhaps a glass of red wine -- among other components characterizing the traditional cooking style of countries bordering the [place].   Most healthy diets include fruits, vegetables, fish and whole grains, and limit unhealthy fats. While these parts of a healthy diet remain tried-and-true, subtle variations or differences in proportions of certain foods may make a difference in your risk of heart disease.   Benefits of the Mediterranean diet  Research has shown that the traditional Mediterranean diet reduces the risk of heart disease. In fact, a recent analysis of more than 1.5 million healthy adults demonstrated that following a Mediterranean diet was associated with a reduced risk of overall and cardiovascular mortality, a reduced incidence of cancer and cancer mortality, and a reduced incidence of Parkinson's and Alzheimer's diseases.   For this reason, most if not all major scientific organizations encourage healthy adults to adapt a style of eating like that of the Mediterranean diet for prevention of major chronic diseases.     Key components of the Mediterranean diet  The Mediterranean diet emphasizes:   Getting plenty of exercise   Eating primarily plant-based foods, such as fruits and vegetables, whole grains, legumes and nuts   Replacing butter with healthy fats such as olive oil and canola oil   Using herbs and spices instead of salt to flavor foods   Limiting red meat to no more than a few times a month   Eating fish and poultry at least twice a week   Drinking red wine in moderation

## 2025-03-25 NOTE — PROGRESS NOTES
3/25/2025    This is a 49 y.o. male   Chief Complaint   Patient presents with    Weight Management     Has questions about weight loss. Has had some fatigue.  Body aches yesterday.     HPI  Here to discuss his weight.   Has gone up 2 pant sizes in the past year.   Currently weighs 352 lbs.   Weight in October was 349 lbs.  May 2024 328 lbs.   Jan 2023 322 lbs.   Wife wanted him to discuss Mounjaro for weight loss.      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) - s/p excision, watchful waiting,     Incisional hernia, without obstruction or gangrene    Essential hypertension    History of colonoscopy with polypectomy - 2025, rpt 2028       Current Outpatient Medications   Medication Sig Dispense Refill    olmesartan (BENICAR) 20 MG tablet TAKE 1 TABLET BY MOUTH DAILY 90 tablet 0    atorvastatin (LIPITOR) 10 MG tablet TAKE 1 TABLET BY MOUTH DAILY 90 tablet 1    FLUoxetine (PROZAC) 20 MG capsule Take 3 capsules by mouth daily (Patient taking differently: Take 2 capsules by mouth daily 2 CAPSULES DAILY) 90 capsule 2    Armodafinil (NUVIGIL PO) Take 200 mg by mouth daily        No current facility-administered medications for this visit.       Allergies   Allergen Reactions    Cipro Xr Swelling    Ampicillin Rash       Review of Systems   Constitutional:  Positive for fatigue.   Respiratory:  Negative for cough and shortness of breath.    Cardiovascular:  Negative for chest pain.   Gastrointestinal:  Negative for diarrhea, nausea and vomiting.   Musculoskeletal:  Negative for myalgias.   Neurological:  Negative for dizziness and light-headedness.   Psychiatric/Behavioral:  Negative for dysphoric mood. The patient is not

## 2025-03-26 ENCOUNTER — RESULTS FOLLOW-UP (OUTPATIENT)
Dept: FAMILY MEDICINE CLINIC | Age: 50
End: 2025-03-26

## 2025-04-25 DIAGNOSIS — I10 ESSENTIAL HYPERTENSION: ICD-10-CM

## 2025-04-28 RX ORDER — OLMESARTAN MEDOXOMIL 20 MG/1
20 TABLET ORAL DAILY
Qty: 90 TABLET | Refills: 0 | Status: SHIPPED | OUTPATIENT
Start: 2025-04-28

## 2025-07-07 DIAGNOSIS — E78.2 MIXED HYPERLIPIDEMIA: ICD-10-CM

## 2025-07-08 RX ORDER — ATORVASTATIN CALCIUM 10 MG/1
10 TABLET, FILM COATED ORAL DAILY
Qty: 90 TABLET | Refills: 1 | Status: SHIPPED | OUTPATIENT
Start: 2025-07-08

## 2025-08-10 DIAGNOSIS — I10 ESSENTIAL HYPERTENSION: ICD-10-CM

## 2025-08-11 RX ORDER — OLMESARTAN MEDOXOMIL 20 MG/1
20 TABLET ORAL DAILY
Qty: 90 TABLET | Refills: 0 | Status: SHIPPED | OUTPATIENT
Start: 2025-08-11

## (undated) DEVICE — SUTURE ABSRB L30CM 2-0 VLT SPRL PDS + STRATAFIX SXPP1B410

## (undated) DEVICE — DRAPE,REIN 53X77,STERILE: Brand: MEDLINE

## (undated) DEVICE — NEEDLE HYPO 22GA L1.5IN BLK POLYPR HUB S STL REG BVL STR

## (undated) DEVICE — GOWN SIRUS NONREIN XL W/TWL: Brand: MEDLINE INDUSTRIES, INC.

## (undated) DEVICE — ENDOSCOPY KIT: Brand: MEDLINE INDUSTRIES, INC.

## (undated) DEVICE — COVER LT HNDL BLU PLAS

## (undated) DEVICE — SPONGE,DISSECTOR,K,XRAY,9/16"X1/4",STRL: Brand: MEDLINE

## (undated) DEVICE — TIP COVER ACCESSORY

## (undated) DEVICE — SOLUTION SCRB 4OZ 10% POVIDONE IOD ANTIMIC BTL

## (undated) DEVICE — ENT I-LF: Brand: MEDLINE INDUSTRIES, INC.

## (undated) DEVICE — CANNULA SEAL

## (undated) DEVICE — SUTURE STRATAFIX SYMMETRIC SZ 1 L18IN ABSRB VLT CT1 L36CM SXPP1A404

## (undated) DEVICE — SUTURE VCRL SZ 0 L27IN ABSRB VLT L22MM CT-3 1/2 CIR J329H

## (undated) DEVICE — CATHETER F BLLN 5CC 16FR 2 W HYDRGEL COAT LESS TRAUM LUB

## (undated) DEVICE — DRAPE,SPLIT ,77X120: Brand: MEDLINE

## (undated) DEVICE — SKIN MARKER REGULAR TIP WITH RULER CAP AND LABELS: Brand: DEVON

## (undated) DEVICE — Device

## (undated) DEVICE — SPONGE GZ W4XL4IN COT 12 PLY TYP VII WVN C FLD DSGN

## (undated) DEVICE — SOLUTION IV IRRIG 250ML ST LF 0.9% SODIUM 2F7122

## (undated) DEVICE — ELECTRODE PT RET AD L9FT HI MOIST COND ADH HYDRGEL CORDED

## (undated) DEVICE — TRAP POLYP ETRAP

## (undated) DEVICE — SUTURE PROL SZ 5-0 L18IN NONABSORBABLE BLU L13MM P-3 3/8 8698G

## (undated) DEVICE — HYPODERMIC SAFETY NEEDLE: Brand: MAGELLAN

## (undated) DEVICE — ADHESIVE SKIN CLOSURE TOP 36 CC HI VISC DERMBND MINI

## (undated) DEVICE — SNARES COLD OVAL 10MM THIN

## (undated) DEVICE — BLADELESS OBTURATOR: Brand: WECK VISTA

## (undated) DEVICE — GLOVE ORANGE PI 7 1/2   MSG9075

## (undated) DEVICE — TOTAL TRAY, DB, 100% SILI FOLEY, 16FR 10: Brand: MEDLINE

## (undated) DEVICE — ROBOTIC GENERAL: Brand: MEDLINE INDUSTRIES, INC.

## (undated) DEVICE — SUTURE DEV SZ 0 L6IN N ABSORBABLE

## (undated) DEVICE — ELECTRODE ELECSURG NDL 2.8 INX7.2 CM COAT INSUL EDGE

## (undated) DEVICE — NEEDLE HYPO 27GA L15IN REG BVL W O SFTY FOR SYR DISPOSABLE

## (undated) DEVICE — REDUCER: Brand: ENDOWRIST

## (undated) DEVICE — PAD N ADH W3XL4IN POLY COT SFT PERF FLM EASILY CUT ABSRB

## (undated) DEVICE — MERCY HEALTH WEST TURNOVER: Brand: MEDLINE INDUSTRIES, INC.

## (undated) DEVICE — FORMALIN CLEAR VIAL 20 ML 10%

## (undated) DEVICE — NEEDLE HYPO 18GA L1.5IN THN WALL PIVOTING SHLD BVL ORIENTED

## (undated) DEVICE — BINDER ABD HK LOOP CLOSURE XLN 15 IN FIVE PANEL SAFE KNIT

## (undated) DEVICE — SYRINGE MED 10ML TRNSLUC BRL PLUNG BLK MRK POLYPR CTRL

## (undated) DEVICE — CONTAINER,SPECIMEN,PNEU TUBE,3OZ,OR STRL: Brand: MEDLINE

## (undated) DEVICE — TOWEL,OR,DSP,ST,BLUE,STD,4/PK,20PK/CS: Brand: MEDLINE

## (undated) DEVICE — STAPLER SKIN H3.9MM WIRE DIA0.58MM CRWN 6.9MM 35 STPL FIX

## (undated) DEVICE — ARM DRAPE

## (undated) DEVICE — TRI-LUMEN FILTERED TUBE SET WITH ACTIVATED CHARCOAL FILTER: Brand: AIRSEAL

## (undated) DEVICE — SUTURE PERMAHAND SZ 2-0 L12X18IN NONABSORBABLE BLK SILK A185H

## (undated) DEVICE — SUTURE VCRL + SZ 4-0 L18IN ABSRB UD L19MM PS-2 3/8 CIR PRIM VCP496H

## (undated) DEVICE — GOWN,AURORA,NONREINF,RAGLAN,XXL,STERILE: Brand: MEDLINE

## (undated) DEVICE — SUTURE VCRL SZ 3-0 L18IN ABSRB UD L26MM SH 1/2 CIR J864D

## (undated) DEVICE — SOLUTION PREP POVIDONE IOD FOR SKIN MUCOUS MEM PRIOR TO

## (undated) DEVICE — STERILE POLYISOPRENE POWDER-FREE SURGICAL GLOVES: Brand: PROTEXIS

## (undated) DEVICE — SYRINGE, LUER LOCK, 10ML: Brand: MEDLINE

## (undated) DEVICE — SUTURE ABSORBABLE MONOFILAMENT 2-0 8 IN ANTIBACT STRATAFIX SXMP1B409

## (undated) DEVICE — Z DISCONTINUED BY MEDLINE USE 2711682 TRAY SKIN PREP DRY W/ PREM GLV